# Patient Record
Sex: MALE | Race: WHITE | NOT HISPANIC OR LATINO | Employment: OTHER | URBAN - METROPOLITAN AREA
[De-identification: names, ages, dates, MRNs, and addresses within clinical notes are randomized per-mention and may not be internally consistent; named-entity substitution may affect disease eponyms.]

---

## 2017-08-25 ENCOUNTER — HOSPITAL ENCOUNTER (EMERGENCY)
Facility: HOSPITAL | Age: 67
Discharge: HOME | End: 2017-08-25
Attending: FAMILY MEDICINE
Payer: OTHER GOVERNMENT

## 2017-08-25 VITALS
BODY MASS INDEX: 31.84 KG/M2 | WEIGHT: 215 LBS | TEMPERATURE: 98 F | SYSTOLIC BLOOD PRESSURE: 129 MMHG | RESPIRATION RATE: 19 BRPM | HEIGHT: 69 IN | DIASTOLIC BLOOD PRESSURE: 69 MMHG | OXYGEN SATURATION: 98 % | HEART RATE: 65 BPM

## 2017-08-25 DIAGNOSIS — R00.2 PALPITATIONS: ICD-10-CM

## 2017-08-25 DIAGNOSIS — I48.92 ATRIAL FLUTTER WITH RAPID VENTRICULAR RESPONSE: Primary | ICD-10-CM

## 2017-08-25 DIAGNOSIS — I49.9 CARDIAC RHYTHM DISORDER OR DISTURBANCE OR CHANGE: ICD-10-CM

## 2017-08-25 LAB
ALBUMIN SERPL BCP-MCNC: 4.3 G/DL
ALP SERPL-CCNC: 104 U/L
ALT SERPL W/O P-5'-P-CCNC: 49 U/L
ANION GAP SERPL CALC-SCNC: 15 MMOL/L
AST SERPL-CCNC: 37 U/L
BASOPHILS # BLD AUTO: 0.05 K/UL
BASOPHILS NFR BLD: 0.6 %
BILIRUB SERPL-MCNC: 0.9 MG/DL
BUN SERPL-MCNC: 14 MG/DL
CALCIUM SERPL-MCNC: 10 MG/DL
CHLORIDE SERPL-SCNC: 106 MMOL/L
CO2 SERPL-SCNC: 24 MMOL/L
CREAT SERPL-MCNC: 0.8 MG/DL
DIFFERENTIAL METHOD: ABNORMAL
EOSINOPHIL # BLD AUTO: 0.3 K/UL
EOSINOPHIL NFR BLD: 4.2 %
ERYTHROCYTE [DISTWIDTH] IN BLOOD BY AUTOMATED COUNT: 13.9 %
EST. GFR  (AFRICAN AMERICAN): >60 ML/MIN/1.73 M^2
EST. GFR  (NON AFRICAN AMERICAN): >60 ML/MIN/1.73 M^2
GLUCOSE SERPL-MCNC: 187 MG/DL
HCT VFR BLD AUTO: 44.1 %
HGB BLD-MCNC: 15.2 G/DL
LYMPHOCYTES # BLD AUTO: 2 K/UL
LYMPHOCYTES NFR BLD: 25.3 %
MCH RBC QN AUTO: 32 PG
MCHC RBC AUTO-ENTMCNC: 34.5 G/DL
MCV RBC AUTO: 93 FL
MONOCYTES # BLD AUTO: 1 K/UL
MONOCYTES NFR BLD: 12 %
NEUTROPHILS # BLD AUTO: 4.7 K/UL
NEUTROPHILS NFR BLD: 57.7 %
NT-PROBNP: 746 PG/ML
PLATELET # BLD AUTO: 227 K/UL
PMV BLD AUTO: 11.7 FL
POTASSIUM SERPL-SCNC: 3.5 MMOL/L
PROT SERPL-MCNC: 7.4 G/DL
RBC # BLD AUTO: 4.75 M/UL
SODIUM SERPL-SCNC: 145 MMOL/L
TROPONIN I SERPL DL<=0.01 NG/ML-MCNC: <0.012 NG/ML
WBC # BLD AUTO: 8.07 K/UL

## 2017-08-25 PROCEDURE — 96374 THER/PROPH/DIAG INJ IV PUSH: CPT

## 2017-08-25 PROCEDURE — 93010 ELECTROCARDIOGRAM REPORT: CPT | Mod: ,,, | Performed by: INTERNAL MEDICINE

## 2017-08-25 PROCEDURE — 99284 EMERGENCY DEPT VISIT MOD MDM: CPT | Mod: 25

## 2017-08-25 PROCEDURE — 85025 COMPLETE CBC W/AUTO DIFF WBC: CPT

## 2017-08-25 PROCEDURE — 83880 ASSAY OF NATRIURETIC PEPTIDE: CPT

## 2017-08-25 PROCEDURE — 96361 HYDRATE IV INFUSION ADD-ON: CPT

## 2017-08-25 PROCEDURE — 94760 N-INVAS EAR/PLS OXIMETRY 1: CPT

## 2017-08-25 PROCEDURE — 25000003 PHARM REV CODE 250: Performed by: FAMILY MEDICINE

## 2017-08-25 PROCEDURE — 93005 ELECTROCARDIOGRAM TRACING: CPT

## 2017-08-25 PROCEDURE — 80053 COMPREHEN METABOLIC PANEL: CPT

## 2017-08-25 PROCEDURE — 27000221 HC OXYGEN, UP TO 24 HOURS

## 2017-08-25 PROCEDURE — 84484 ASSAY OF TROPONIN QUANT: CPT

## 2017-08-25 RX ORDER — BUPROPION HYDROCHLORIDE 150 MG/1
150 TABLET, EXTENDED RELEASE ORAL 2 TIMES DAILY
COMMUNITY

## 2017-08-25 RX ORDER — PREGABALIN 75 MG/1
75 CAPSULE ORAL 2 TIMES DAILY
COMMUNITY

## 2017-08-25 RX ORDER — POTASSIUM CHLORIDE 1.5 G/1.58G
20 POWDER, FOR SOLUTION ORAL ONCE
COMMUNITY

## 2017-08-25 RX ORDER — DILTIAZEM HYDROCHLORIDE EXTENDED-RELEASE TABLETS 120 MG/1
120 TABLET, EXTENDED RELEASE ORAL DAILY
Qty: 30 TABLET | Refills: 0 | Status: SHIPPED | OUTPATIENT
Start: 2017-08-25 | End: 2018-08-25

## 2017-08-25 RX ORDER — METFORMIN HYDROCHLORIDE 1000 MG/1
1000 TABLET ORAL 2 TIMES DAILY WITH MEALS
COMMUNITY

## 2017-08-25 RX ORDER — HYDROXYZINE HYDROCHLORIDE 25 MG/1
25 TABLET, FILM COATED ORAL 3 TIMES DAILY PRN
COMMUNITY

## 2017-08-25 RX ORDER — ASPIRIN 325 MG
325 TABLET ORAL
Status: COMPLETED | OUTPATIENT
Start: 2017-08-25 | End: 2017-08-25

## 2017-08-25 RX ORDER — DILTIAZEM HYDROCHLORIDE 5 MG/ML
20 INJECTION INTRAVENOUS
Status: COMPLETED | OUTPATIENT
Start: 2017-08-25 | End: 2017-08-25

## 2017-08-25 RX ORDER — FLUOXETINE HYDROCHLORIDE 20 MG/1
20 CAPSULE ORAL DAILY
COMMUNITY

## 2017-08-25 RX ORDER — METOPROLOL TARTRATE 1 MG/ML
5 INJECTION, SOLUTION INTRAVENOUS
Status: DISCONTINUED | OUTPATIENT
Start: 2017-08-25 | End: 2017-08-25

## 2017-08-25 RX ORDER — AMLODIPINE BESYLATE 10 MG/1
10 TABLET ORAL DAILY
COMMUNITY

## 2017-08-25 RX ADMIN — SODIUM CHLORIDE 500 ML: 0.9 INJECTION, SOLUTION INTRAVENOUS at 03:08

## 2017-08-25 RX ADMIN — ASPIRIN 325 MG ORAL TABLET 325 MG: 325 PILL ORAL at 03:08

## 2017-08-25 RX ADMIN — DILTIAZEM HYDROCHLORIDE 20 MG: 5 INJECTION INTRAVENOUS at 03:08

## 2017-08-25 NOTE — ED PROVIDER NOTES
Encounter Date: 8/25/2017       History     Chief Complaint   Patient presents with    Tachycardia     Pt reports a sensation of heart racing and states that his HR this am was 130's, denies cp      66-year-old male presents with chief complaint of tachycardia.  Patient reports awoke today noted his heart was beating rapidly.  Also notes chest heaviness.  Patient denies any shortness of breath.  Denies any fever chills states the chest heaviness is mild.          Review of patient's allergies indicates:  No Known Allergies  Past Medical History:   Diagnosis Date    Diabetes mellitus     High cholesterol     Hypertension     Neuropathy     PTSD (post-traumatic stress disorder)      History reviewed. No pertinent surgical history.  History reviewed. No pertinent family history.  Social History   Substance Use Topics    Smoking status: Former Smoker    Smokeless tobacco: Never Used    Alcohol use Yes     Review of Systems   Constitutional: Negative for chills and fever.   Respiratory: Negative for shortness of breath.    Cardiovascular: Positive for chest pain.   Gastrointestinal: Negative for abdominal pain, nausea and vomiting.   All other systems reviewed and are negative.      Physical Exam     Initial Vitals [08/25/17 1456]   BP Pulse Resp Temp SpO2   126/79 (!) 126 20 98.1 °F (36.7 °C) 97 %      MAP       94.67         Physical Exam    Nursing note and vitals reviewed.  Constitutional: He appears well-developed and well-nourished.   HENT:   Head: Normocephalic and atraumatic.   Eyes: EOM are normal. Pupils are equal, round, and reactive to light.   Neck: Normal range of motion. Neck supple.   Cardiovascular: Normal rate, regular rhythm and normal heart sounds.   Pulmonary/Chest: Breath sounds normal.   Abdominal: Soft.   Musculoskeletal: Normal range of motion.   Neurological: He is alert and oriented to person, place, and time.   Skin: Skin is warm. Capillary refill takes less than 2 seconds.    Psychiatric: He has a normal mood and affect. His behavior is normal.         ED Course   Procedures  Labs Reviewed   CBC W/ AUTO DIFFERENTIAL - Abnormal; Notable for the following:        Result Value    MCH 32.0 (*)     All other components within normal limits   COMPREHENSIVE METABOLIC PANEL - Abnormal; Notable for the following:     Glucose 187 (*)     ALT 49 (*)     All other components within normal limits   TROPONIN I   NT-PRO NATRIURETIC PEPTIDE     EKG Readings: (Independently Interpreted)   Initial Reading: No STEMI. Rhythm: Atrial Flutter.   atrial flutter at 31 beats for minute normal QRS and normal QT                            ED Course    4:30 PM patient converted back into sinus rhythm continues to deny any complaints at present.  Discussed with Dr. Santiago concurs with outpatient management will DC the amlodipine and start the patient on Cardizem 120mg  And follow up with cardiology on Monday.  Clinical Impression:   The primary encounter diagnosis was Atrial flutter with rapid ventricular response. Diagnoses of Palpitations and Cardiac rhythm disorder or disturbance or change were also pertinent to this visit.                           Jcarlos Jenkins MD  08/25/17 4773

## 2024-09-13 ENCOUNTER — HOSPITAL ENCOUNTER (INPATIENT)
Facility: HOSPITAL | Age: 74
LOS: 1 days | Discharge: NURSING, REHAB OR LONG-TERM CARE | DRG: 069 | End: 2024-09-15
Attending: STUDENT IN AN ORGANIZED HEALTH CARE EDUCATION/TRAINING PROGRAM | Admitting: STUDENT IN AN ORGANIZED HEALTH CARE EDUCATION/TRAINING PROGRAM
Payer: OTHER GOVERNMENT

## 2024-09-13 DIAGNOSIS — R29.818 ACUTE FOCAL NEUROLOGICAL DEFICIT: ICD-10-CM

## 2024-09-13 DIAGNOSIS — I63.02 STROKE DUE TO THROMBOSIS OF BASILAR ARTERY: Primary | ICD-10-CM

## 2024-09-13 PROBLEM — E11.9 TYPE 2 DIABETES MELLITUS WITHOUT COMPLICATION, WITHOUT LONG-TERM CURRENT USE OF INSULIN: Status: ACTIVE | Noted: 2024-09-13

## 2024-09-13 PROBLEM — I48.92 ATRIAL FLUTTER: Status: ACTIVE | Noted: 2024-09-13

## 2024-09-13 PROBLEM — F43.10 PTSD (POST-TRAUMATIC STRESS DISORDER): Status: ACTIVE | Noted: 2024-09-13

## 2024-09-13 PROBLEM — I10 PRIMARY HYPERTENSION: Status: ACTIVE | Noted: 2024-09-13

## 2024-09-13 PROBLEM — F32.A DEPRESSION: Status: ACTIVE | Noted: 2024-09-13

## 2024-09-13 PROBLEM — E78.2 MIXED HYPERLIPIDEMIA: Status: ACTIVE | Noted: 2024-09-13

## 2024-09-13 LAB
ALBUMIN SERPL BCP-MCNC: 3.6 G/DL (ref 3.5–5.2)
ALP SERPL-CCNC: 101 U/L (ref 55–135)
ALT SERPL W/O P-5'-P-CCNC: 24 U/L (ref 10–44)
ANION GAP SERPL CALC-SCNC: 14 MMOL/L (ref 8–16)
AST SERPL-CCNC: 17 U/L (ref 10–40)
BASOPHILS # BLD AUTO: 0.06 K/UL (ref 0–0.2)
BASOPHILS NFR BLD: 0.6 % (ref 0–1.9)
BILIRUB SERPL-MCNC: 0.9 MG/DL (ref 0.1–1)
BNP SERPL-MCNC: 15 PG/ML (ref 0–99)
BUN SERPL-MCNC: 45 MG/DL (ref 8–23)
CALCIUM SERPL-MCNC: 10.1 MG/DL (ref 8.7–10.5)
CHLORIDE SERPL-SCNC: 104 MMOL/L (ref 95–110)
CHOLEST SERPL-MCNC: 122 MG/DL (ref 120–199)
CHOLEST/HDLC SERPL: 4.2 {RATIO} (ref 2–5)
CO2 SERPL-SCNC: 22 MMOL/L (ref 23–29)
CREAT SERPL-MCNC: 1.1 MG/DL (ref 0.5–1.4)
CREAT SERPL-MCNC: 1.3 MG/DL (ref 0.5–1.4)
DIFFERENTIAL METHOD BLD: ABNORMAL
EOSINOPHIL # BLD AUTO: 0.6 K/UL (ref 0–0.5)
EOSINOPHIL NFR BLD: 5.4 % (ref 0–8)
ERYTHROCYTE [DISTWIDTH] IN BLOOD BY AUTOMATED COUNT: 13.4 % (ref 11.5–14.5)
EST. GFR  (NO RACE VARIABLE): >60 ML/MIN/1.73 M^2
GLUCOSE SERPL-MCNC: 96 MG/DL (ref 70–110)
HCT VFR BLD AUTO: 40.3 % (ref 40–54)
HDLC SERPL-MCNC: 29 MG/DL (ref 40–75)
HDLC SERPL: 23.8 % (ref 20–50)
HGB BLD-MCNC: 13 G/DL (ref 14–18)
IMM GRANULOCYTES # BLD AUTO: 0.06 K/UL (ref 0–0.04)
IMM GRANULOCYTES NFR BLD AUTO: 0.6 % (ref 0–0.5)
INR PPP: 0.9 (ref 0.8–1.2)
LDLC SERPL CALC-MCNC: 71 MG/DL (ref 63–159)
LYMPHOCYTES # BLD AUTO: 3.7 K/UL (ref 1–4.8)
LYMPHOCYTES NFR BLD: 34.4 % (ref 18–48)
MCH RBC QN AUTO: 31.9 PG (ref 27–31)
MCHC RBC AUTO-ENTMCNC: 32.3 G/DL (ref 32–36)
MCV RBC AUTO: 99 FL (ref 82–98)
MONOCYTES # BLD AUTO: 1.4 K/UL (ref 0.3–1)
MONOCYTES NFR BLD: 12.5 % (ref 4–15)
NEUTROPHILS # BLD AUTO: 5.1 K/UL (ref 1.8–7.7)
NEUTROPHILS NFR BLD: 46.5 % (ref 38–73)
NONHDLC SERPL-MCNC: 93 MG/DL
NRBC BLD-RTO: 0 /100 WBC
OHS QRS DURATION: 98 MS
OHS QTC CALCULATION: 445 MS
PLATELET # BLD AUTO: 218 K/UL (ref 150–450)
PMV BLD AUTO: 11.7 FL (ref 9.2–12.9)
POC PTINR: 1.2 (ref 0.9–1.2)
POC PTWBT: 14.1 SEC (ref 9.7–14.3)
POCT GLUCOSE: 114 MG/DL (ref 70–110)
POTASSIUM SERPL-SCNC: 4 MMOL/L (ref 3.5–5.1)
PROT SERPL-MCNC: 7.1 G/DL (ref 6–8.4)
PROTHROMBIN TIME: 10.3 SEC (ref 9–12.5)
RBC # BLD AUTO: 4.07 M/UL (ref 4.6–6.2)
SAMPLE: NORMAL
SAMPLE: NORMAL
SODIUM SERPL-SCNC: 140 MMOL/L (ref 136–145)
TRIGL SERPL-MCNC: 110 MG/DL (ref 30–150)
TROPONIN I SERPL DL<=0.01 NG/ML-MCNC: <0.006 NG/ML (ref 0–0.03)
TSH SERPL DL<=0.005 MIU/L-ACNC: 0.79 UIU/ML (ref 0.4–4)
WBC # BLD AUTO: 10.84 K/UL (ref 3.9–12.7)

## 2024-09-13 PROCEDURE — 85025 COMPLETE CBC W/AUTO DIFF WBC: CPT | Performed by: EMERGENCY MEDICINE

## 2024-09-13 PROCEDURE — 25000003 PHARM REV CODE 250: Performed by: PHYSICIAN ASSISTANT

## 2024-09-13 PROCEDURE — 84484 ASSAY OF TROPONIN QUANT: CPT | Performed by: EMERGENCY MEDICINE

## 2024-09-13 PROCEDURE — 99285 EMERGENCY DEPT VISIT HI MDM: CPT | Mod: 25

## 2024-09-13 PROCEDURE — 83880 ASSAY OF NATRIURETIC PEPTIDE: CPT | Performed by: EMERGENCY MEDICINE

## 2024-09-13 PROCEDURE — G0378 HOSPITAL OBSERVATION PER HR: HCPCS

## 2024-09-13 PROCEDURE — 83036 HEMOGLOBIN GLYCOSYLATED A1C: CPT | Performed by: PHYSICIAN ASSISTANT

## 2024-09-13 PROCEDURE — 25000003 PHARM REV CODE 250: Performed by: STUDENT IN AN ORGANIZED HEALTH CARE EDUCATION/TRAINING PROGRAM

## 2024-09-13 PROCEDURE — 85610 PROTHROMBIN TIME: CPT

## 2024-09-13 PROCEDURE — 99900035 HC TECH TIME PER 15 MIN (STAT)

## 2024-09-13 PROCEDURE — 85610 PROTHROMBIN TIME: CPT | Performed by: EMERGENCY MEDICINE

## 2024-09-13 PROCEDURE — 80061 LIPID PANEL: CPT | Performed by: EMERGENCY MEDICINE

## 2024-09-13 PROCEDURE — 84443 ASSAY THYROID STIM HORMONE: CPT | Performed by: EMERGENCY MEDICINE

## 2024-09-13 PROCEDURE — 25500020 PHARM REV CODE 255: Performed by: STUDENT IN AN ORGANIZED HEALTH CARE EDUCATION/TRAINING PROGRAM

## 2024-09-13 PROCEDURE — 93010 ELECTROCARDIOGRAM REPORT: CPT | Mod: ,,, | Performed by: INTERNAL MEDICINE

## 2024-09-13 PROCEDURE — 93005 ELECTROCARDIOGRAM TRACING: CPT

## 2024-09-13 PROCEDURE — 82962 GLUCOSE BLOOD TEST: CPT

## 2024-09-13 PROCEDURE — 99223 1ST HOSP IP/OBS HIGH 75: CPT | Mod: ,,, | Performed by: STUDENT IN AN ORGANIZED HEALTH CARE EDUCATION/TRAINING PROGRAM

## 2024-09-13 PROCEDURE — 82565 ASSAY OF CREATININE: CPT

## 2024-09-13 PROCEDURE — 80053 COMPREHEN METABOLIC PANEL: CPT | Performed by: EMERGENCY MEDICINE

## 2024-09-13 RX ORDER — CARVEDILOL 12.5 MG/1
12.5 TABLET ORAL EVERY MORNING
COMMUNITY

## 2024-09-13 RX ORDER — IBUPROFEN 200 MG
24 TABLET ORAL
Status: DISCONTINUED | OUTPATIENT
Start: 2024-09-13 | End: 2024-09-15 | Stop reason: HOSPADM

## 2024-09-13 RX ORDER — HYDROXYZINE HYDROCHLORIDE 25 MG/1
25 TABLET, FILM COATED ORAL
Status: ACTIVE | OUTPATIENT
Start: 2024-09-13 | End: 2024-09-14

## 2024-09-13 RX ORDER — LISINOPRIL 20 MG/1
20 TABLET ORAL DAILY
COMMUNITY

## 2024-09-13 RX ORDER — LABETALOL HCL 20 MG/4 ML
10 SYRINGE (ML) INTRAVENOUS
Status: DISCONTINUED | OUTPATIENT
Start: 2024-09-13 | End: 2024-09-15 | Stop reason: HOSPADM

## 2024-09-13 RX ORDER — QUETIAPINE FUMARATE 25 MG/1
50 TABLET, FILM COATED ORAL NIGHTLY
Status: DISCONTINUED | OUTPATIENT
Start: 2024-09-13 | End: 2024-09-13

## 2024-09-13 RX ORDER — QUETIAPINE FUMARATE 25 MG/1
50 TABLET, FILM COATED ORAL NIGHTLY
Status: DISCONTINUED | OUTPATIENT
Start: 2024-09-14 | End: 2024-09-15 | Stop reason: HOSPADM

## 2024-09-13 RX ORDER — IBUPROFEN 200 MG
16 TABLET ORAL
Status: DISCONTINUED | OUTPATIENT
Start: 2024-09-13 | End: 2024-09-15 | Stop reason: HOSPADM

## 2024-09-13 RX ORDER — BUPROPION HYDROCHLORIDE 150 MG/1
150 TABLET ORAL DAILY
Status: DISCONTINUED | OUTPATIENT
Start: 2024-09-14 | End: 2024-09-15 | Stop reason: HOSPADM

## 2024-09-13 RX ORDER — ASPIRIN 81 MG/1
81 TABLET ORAL DAILY
Status: DISCONTINUED | OUTPATIENT
Start: 2024-09-14 | End: 2024-09-15 | Stop reason: HOSPADM

## 2024-09-13 RX ORDER — ASPIRIN 81 MG/1
81 TABLET ORAL DAILY
COMMUNITY

## 2024-09-13 RX ORDER — CHOLECALCIFEROL (VITAMIN D3) 25 MCG
2000 TABLET ORAL DAILY
COMMUNITY

## 2024-09-13 RX ORDER — HYDROCHLOROTHIAZIDE 12.5 MG/1
12.5 TABLET ORAL DAILY
COMMUNITY

## 2024-09-13 RX ORDER — MIRABEGRON 25 MG/1
25 TABLET, FILM COATED, EXTENDED RELEASE ORAL DAILY
COMMUNITY

## 2024-09-13 RX ORDER — NAPROXEN SODIUM 220 MG/1
81 TABLET, FILM COATED ORAL DAILY
COMMUNITY
End: 2024-09-13 | Stop reason: CLARIF

## 2024-09-13 RX ORDER — AMOXICILLIN 250 MG
2 CAPSULE ORAL DAILY PRN
COMMUNITY

## 2024-09-13 RX ORDER — CARVEDILOL 25 MG/1
25 TABLET ORAL NIGHTLY
Status: DISCONTINUED | OUTPATIENT
Start: 2024-09-13 | End: 2024-09-15 | Stop reason: HOSPADM

## 2024-09-13 RX ORDER — ACETAMINOPHEN 325 MG/1
650 TABLET ORAL EVERY 6 HOURS PRN
Status: DISCONTINUED | OUTPATIENT
Start: 2024-09-13 | End: 2024-09-15 | Stop reason: HOSPADM

## 2024-09-13 RX ORDER — CHOLECALCIFEROL (VITAMIN D3) 25 MCG
2000 TABLET ORAL DAILY
Status: DISCONTINUED | OUTPATIENT
Start: 2024-09-14 | End: 2024-09-15 | Stop reason: HOSPADM

## 2024-09-13 RX ORDER — DEXTROMETHORPHAN HYDROBROMIDE, GUAIFENESIN 5; 100 MG/5ML; MG/5ML
650 LIQUID ORAL EVERY 6 HOURS PRN
COMMUNITY

## 2024-09-13 RX ORDER — LANOLIN ALCOHOL/MO/W.PET/CERES
100 CREAM (GRAM) TOPICAL DAILY
COMMUNITY

## 2024-09-13 RX ORDER — TRAZODONE HYDROCHLORIDE 50 MG/1
25 TABLET ORAL NIGHTLY PRN
COMMUNITY

## 2024-09-13 RX ORDER — GABAPENTIN 100 MG/1
200 CAPSULE ORAL 2 TIMES DAILY
COMMUNITY

## 2024-09-13 RX ORDER — SODIUM CHLORIDE 0.9 % (FLUSH) 0.9 %
10 SYRINGE (ML) INJECTION
Status: DISCONTINUED | OUTPATIENT
Start: 2024-09-13 | End: 2024-09-15 | Stop reason: HOSPADM

## 2024-09-13 RX ORDER — GABAPENTIN 100 MG/1
200 CAPSULE ORAL 2 TIMES DAILY
Status: DISCONTINUED | OUTPATIENT
Start: 2024-09-13 | End: 2024-09-15 | Stop reason: HOSPADM

## 2024-09-13 RX ORDER — ATORVASTATIN CALCIUM 80 MG/1
80 TABLET, FILM COATED ORAL DAILY
COMMUNITY

## 2024-09-13 RX ORDER — ATORVASTATIN CALCIUM 40 MG/1
80 TABLET, FILM COATED ORAL DAILY
Status: DISCONTINUED | OUTPATIENT
Start: 2024-09-14 | End: 2024-09-15 | Stop reason: HOSPADM

## 2024-09-13 RX ORDER — LANOLIN ALCOHOL/MO/W.PET/CERES
1000 CREAM (GRAM) TOPICAL DAILY
Status: DISCONTINUED | OUTPATIENT
Start: 2024-09-14 | End: 2024-09-15 | Stop reason: HOSPADM

## 2024-09-13 RX ORDER — BUPROPION HYDROCHLORIDE 75 MG/1
150 TABLET ORAL
Status: COMPLETED | OUTPATIENT
Start: 2024-09-13 | End: 2024-09-13

## 2024-09-13 RX ORDER — QUETIAPINE FUMARATE 25 MG/1
50 TABLET, FILM COATED ORAL NIGHTLY
COMMUNITY

## 2024-09-13 RX ORDER — CLOPIDOGREL BISULFATE 75 MG/1
75 TABLET ORAL DAILY
COMMUNITY

## 2024-09-13 RX ORDER — BISACODYL 10 MG/1
10 SUPPOSITORY RECTAL DAILY PRN
Status: DISCONTINUED | OUTPATIENT
Start: 2024-09-13 | End: 2024-09-15 | Stop reason: HOSPADM

## 2024-09-13 RX ORDER — FLUOXETINE HYDROCHLORIDE 20 MG/1
60 CAPSULE ORAL DAILY
Status: DISCONTINUED | OUTPATIENT
Start: 2024-09-14 | End: 2024-09-15 | Stop reason: HOSPADM

## 2024-09-13 RX ORDER — CARVEDILOL 25 MG/1
25 TABLET ORAL NIGHTLY
COMMUNITY

## 2024-09-13 RX ORDER — OXYBUTYNIN CHLORIDE 5 MG/1
5 TABLET, EXTENDED RELEASE ORAL DAILY
Status: DISCONTINUED | OUTPATIENT
Start: 2024-09-14 | End: 2024-09-15 | Stop reason: HOSPADM

## 2024-09-13 RX ORDER — INSULIN ASPART 100 [IU]/ML
0-5 INJECTION, SOLUTION INTRAVENOUS; SUBCUTANEOUS
Status: DISCONTINUED | OUTPATIENT
Start: 2024-09-13 | End: 2024-09-15 | Stop reason: HOSPADM

## 2024-09-13 RX ORDER — GLUCAGON 1 MG
1 KIT INJECTION
Status: DISCONTINUED | OUTPATIENT
Start: 2024-09-13 | End: 2024-09-15 | Stop reason: HOSPADM

## 2024-09-13 RX ORDER — POLYETHYLENE GLYCOL 3350 17 G/17G
17 POWDER, FOR SOLUTION ORAL DAILY PRN
COMMUNITY
End: 2024-09-13

## 2024-09-13 RX ORDER — CARVEDILOL 12.5 MG/1
12.5 TABLET ORAL EVERY MORNING
Status: DISCONTINUED | OUTPATIENT
Start: 2024-09-14 | End: 2024-09-15 | Stop reason: HOSPADM

## 2024-09-13 RX ORDER — BIOTIN 5000 MCG
6 TABLET,DISINTEGRATING ORAL NIGHTLY PRN
COMMUNITY

## 2024-09-13 RX ORDER — QUETIAPINE FUMARATE 25 MG/1
50 TABLET, FILM COATED ORAL
Status: COMPLETED | OUTPATIENT
Start: 2024-09-13 | End: 2024-09-13

## 2024-09-13 RX ORDER — CLOPIDOGREL BISULFATE 75 MG/1
75 TABLET ORAL DAILY
Status: DISCONTINUED | OUTPATIENT
Start: 2024-09-14 | End: 2024-09-15 | Stop reason: HOSPADM

## 2024-09-13 RX ORDER — MULTIVITAMIN
1 TABLET ORAL DAILY
COMMUNITY

## 2024-09-13 RX ORDER — AMOXICILLIN 250 MG
2 CAPSULE ORAL DAILY PRN
Status: DISCONTINUED | OUTPATIENT
Start: 2024-09-13 | End: 2024-09-15 | Stop reason: HOSPADM

## 2024-09-13 RX ORDER — HEPARIN SODIUM 5000 [USP'U]/ML
5000 INJECTION, SOLUTION INTRAVENOUS; SUBCUTANEOUS EVERY 8 HOURS
Status: DISCONTINUED | OUTPATIENT
Start: 2024-09-13 | End: 2024-09-15 | Stop reason: HOSPADM

## 2024-09-13 RX ORDER — LOPERAMIDE HCL 2 MG
2 TABLET ORAL 3 TIMES DAILY PRN
COMMUNITY

## 2024-09-13 RX ADMIN — IOHEXOL 100 ML: 350 INJECTION, SOLUTION INTRAVENOUS at 12:09

## 2024-09-13 RX ADMIN — CARVEDILOL 25 MG: 25 TABLET, FILM COATED ORAL at 08:09

## 2024-09-13 RX ADMIN — QUETIAPINE FUMARATE 50 MG: 25 TABLET ORAL at 08:09

## 2024-09-13 RX ADMIN — BUPROPION HYDROCHLORIDE 150 MG: 75 TABLET, FILM COATED ORAL at 08:09

## 2024-09-13 RX ADMIN — GABAPENTIN 200 MG: 100 CAPSULE ORAL at 08:09

## 2024-09-13 NOTE — ED NOTES
Eran Patel, a 73 y.o. male presents to the ED w/ complaint of worsening right sided weakness. Pt is admitted to [facility] for recent stroke with rt sided deficits. Pt states this am approx 0500 he felt he could not move his rt leg and rt arm was weaker.    Triage note:  Chief Complaint   Patient presents with    Extremity Weakness     Increased right sided weakness at 5AM this morning. Recent CVA 2 weeks ago.      Review of patient's allergies indicates:  No Known Allergies  Past Medical History:   Diagnosis Date    Diabetes mellitus     High cholesterol     Hypertension     Neuropathy     PTSD (post-traumatic stress disorder)          Patient identifiers verified and correct for   LOC: The patient is awake, alert and aware of environment with an appropriate affect, the patient is oriented x 3 speaking with some dysarthria..   APPEARANCE: Patient appears comfortable and in no acute distress, patient is clean and well groomed.  SKIN: The skin is warm and dry, color consistent with ethnicity, patient has normal skin turgor and moist mucus membranes, skin intact, no breakdown or bruising noted.   MUSCULOSKELETAL: Patient has rt sided weakness, states is worse this morning.  RESPIRATORY: Airway is open and patent, respirations are spontaneous, patient has a normal effort and rate, no accessory muscle use noted, pt placed on continuous pulse ox with O2 sats noted at 97% on room air.  CARDIAC: Pt placed on cardiac monitor. Patient has a normal rate and regular rhythm, no edema noted, capillary refill < 3 seconds.   GASTRO: Soft and non tender to palpation, no distention noted, normoactive bowel sounds present in all four quadrants. Pt states bowel movements have been regular.  : Pt denies any pain or frequency with urination.  NEURO: Pt opens eyes spontaneously, behavior appropriate to situation, follows commands, facial expression asymetrical, slight droop on rt side, rt hand  weak, left normal and even,  purposeful motor response noted, normal sensation in all extremities when touched with a finger.

## 2024-09-13 NOTE — ED NOTES
Pt becoming restless and voicing that he wants to leave if he cannot have the MRI scan soon. MRI contacted he will be brought over soon.

## 2024-09-13 NOTE — ED PROVIDER NOTES
Chief Complaint   Extremity Weakness (Increased right sided weakness at 5AM this morning. Recent CVA 2 weeks ago. )      History Of Present Illness   Eran Patel is a 73 y.o. male with a PMHx including HTN, HLD, DM2, Basal cell carcinoma, PTSD, depression, and recent pontine stroke on 9/3  presenting with worsened R sided weakness.  Patient was recently seen at an outside hospital on 09/03 after presenting with right-sided weakness and left-sided facial droop.  He was found to have a pontine stroke.  No acute intervention at that time.  Patient was discharged to rehab.  Patient states that he went to bed last night around 10:00 p.m. feeling his baseline.  States he woke up at around 5:00 a.m. and had increased right-sided weakness.  States that he tried to go to his PT and OT but felt like his right leg was in cement.  He otherwise reports no recent falls or head injury.  He reports no chest pain, shortness of breath, abdominal pain, vomiting, or diarrhea.  He reports no left-sided weakness or numbness.    Independent Historian: Yes  Other Historian or Collateral: Chart review  Interpretor: No      Review of patient's allergies indicates:  No Known Allergies    No current facility-administered medications on file prior to encounter.     Current Outpatient Medications on File Prior to Encounter   Medication Sig Dispense Refill    alprostadil (MUSE) 1000 MCG pellet 1,000 mcg by Transurethral route as needed for Erectile Dysfunction. use no more than 3 times per week      amlodipine (NORVASC) 10 MG tablet Take 10 mg by mouth once daily.      buPROPion (WELLBUTRIN SR) 150 MG TBSR 12 hr tablet Take 150 mg by mouth 2 (two) times daily.      diltiaZEM (CARDIZEM LA) 120 mg 24 hr tablet Take 1 tablet (120 mg total) by mouth once daily. 30 tablet 0    fluoxetine (PROZAC) 20 MG capsule Take 20 mg by mouth once daily.      hydrOXYzine HCl (ATARAX) 25 MG tablet Take 25 mg by mouth 3 (three) times daily as needed for  Itching.      metformin (GLUCOPHAGE) 1000 MG tablet Take 1,000 mg by mouth 2 (two) times daily with meals.      metoclopramide HCl (REGLAN) 10 MG tablet Take 1 tablet (10 mg total) by mouth every 6 (six) hours. 30 tablet 0    potassium chloride (KLOR-CON) 20 mEq Pack Take 20 mEq by mouth once.      pregabalin (LYRICA) 75 MG capsule Take 75 mg by mouth 2 (two) times daily.         Past History   As per HPI and below:  Past Medical History:   Diagnosis Date    Diabetes mellitus     High cholesterol     Hypertension     Neuropathy     PTSD (post-traumatic stress disorder)      No past surgical history on file.    Social History     Socioeconomic History    Marital status:    Tobacco Use    Smoking status: Former    Smokeless tobacco: Never   Substance and Sexual Activity    Alcohol use: Yes    Drug use: No     Social Determinants of Health     Financial Resource Strain: Low Risk  (9/9/2024)    Received from [facility]    Overall Financial Resource Strain (CARDIA)     Difficulty of Paying Living Expenses: Not hard at all   Food Insecurity: No Food Insecurity (9/9/2024)    Received from [facility]    Hunger Vital Sign     Worried About Running Out of Food in the Last Year: Never true     Ran Out of Food in the Last Year: Never true   Transportation Needs: No Transportation Needs (9/6/2024)    Received from [facility] Transportation Source     Has lack of transportation kept you from medical appointments or from getting medications?: No     Has lack of transportation kept you from meetings, work, or from getting things needed for daily living?: No   Stress: No Stress Concern Present (9/6/2024)    Received from [facility] - Occupational Stress Questionnaire     Feeling of Stress : Not at all   Housing Stability: Low Risk  (9/9/2024)    Received from [facility]    Housing Stability Vital Sign     Unable to Pay for Housing in the Last Year: No      Number of Times Moved in the Last Year: 1     Homeless in the Last Year: No       No family history on file.    Physical Exam     Vitals:    09/13/24 1236   BP: (!) 153/78   Pulse: 86   Resp: 18   Temp: 98.1 °F (36.7 °C)   TempSrc: Temporal   SpO2: 97%       Physical Exam  Constitutional:       General: He is not in acute distress.     Appearance: He is not ill-appearing, toxic-appearing or diaphoretic.   HENT:      Head: Normocephalic and atraumatic.      Right Ear: External ear normal.      Left Ear: External ear normal.      Nose: Nose normal.      Mouth/Throat:      Mouth: Mucous membranes are moist.      Pharynx: Oropharynx is clear.   Eyes:      Extraocular Movements: Extraocular movements intact.      Pupils: Pupils are equal, round, and reactive to light.   Cardiovascular:      Rate and Rhythm: Normal rate and regular rhythm.   Pulmonary:      Effort: No respiratory distress.      Breath sounds: No wheezing or rhonchi.   Chest:      Chest wall: No tenderness.   Abdominal:      General: There is no distension.      Tenderness: There is no abdominal tenderness. There is no guarding or rebound.   Musculoskeletal:         General: No swelling or deformity.      Cervical back: No rigidity.   Skin:     General: Skin is warm.      Capillary Refill: Capillary refill takes less than 2 seconds.   Neurological:      Mental Status: He is alert.      Comments: Left-sided facial droop, slurred speech  Right arm weakness, 4/5 strength  Right leg weakness, 2/5 strength  Grossly intact sensation to light touch.  Alert and oriented x4.             Results     Labs Reviewed   POCT GLUCOSE - Abnormal       Result Value    POCT Glucose 114 (*)    CBC W/ AUTO DIFFERENTIAL   COMPREHENSIVE METABOLIC PANEL   PROTIME-INR   TSH   LIPID PANEL   TROPONIN I   B-TYPE NATRIURETIC PEPTIDE   POCT GLUCOSE, HAND-HELD DEVICE       Imaging Results    None           Initial MDM   Medical Decision Making  Patient is a 72 yo M presenting with R  sided weakness. Most concerning for acute CVA, decreased perfusion to area of recent stroke, encephalopathy. Code stroke activated upon arrival. CT imaging without evidence of hemorrhage but concerning for subacute pontine stroke. Sxs improving upon reevaluation. Vascular neurology has seen the patient and advise no acute intervention at this time. MRI imaging pending.     Amount and/or Complexity of Data Reviewed  Labs:  Decision-making details documented in ED Course.    Risk  Prescription drug management.                  Medications Given / Interventions   Medications - No data to display    Procedures     ED POCUS Performed: No    Reassessment and ED Course     ED Course as of 09/17/24 1354   Fri Sep 13, 2024   1307 CT concerning for subacute pontine stroke.  No LVOT seen.  MRI pending to see change from prior. [CH]   1400 Troponin I: <0.006 [CH]   1401 CMP grossly okay [CH]   1401 CBC grossly okay [CH]   1401 POCT Glucose(!): 114 [CH]      ED Course User Index  [CH] Alesia Kern MD   Patient unable to get cleared for MRI at this time due to implant. [facility] neuro recommends admission and repeat CT in the AM. Discussed with patient and wife at bedside.            Final diagnoses:  [R29.818] Acute focal neurological deficit                 Dispo                        Alesia Kern MD  09/17/24 6152

## 2024-09-13 NOTE — ED NOTES
Pt sent back from MRI because he has a prosthetic penis. They want mfr info before they will scan him. Team notified. See orders.

## 2024-09-13 NOTE — SUBJECTIVE & OBJECTIVE
Past Medical History:   Diagnosis Date    Diabetes mellitus     High cholesterol     Hypertension     Neuropathy     PTSD (post-traumatic stress disorder)      No past surgical history on file.  Social History     Tobacco Use    Smoking status: Former    Smokeless tobacco: Never   Substance Use Topics    Alcohol use: Yes    Drug use: No     Review of patient's allergies indicates:  No Known Allergies    Medications: I have reviewed the current medication administration record.    (Not in a hospital admission)      Review of Systems   Constitutional:  Negative for fever.   HENT:  Negative for congestion and sore throat.    Genitourinary:  Negative for dysuria.   Neurological:  Positive for facial asymmetry, speech difficulty and weakness. Negative for numbness.     Objective:     Vital Signs (Most Recent):  Temp: 98.1 °F (36.7 °C) (09/13/24 1236)  Pulse: 86 (09/13/24 1236)  Resp: 18 (09/13/24 1236)  BP: (!) 153/78 (09/13/24 1236)  SpO2: 97 % (09/13/24 1236)    Vital Signs Range (Last 24H):  Temp:  [98.1 °F (36.7 °C)]   Pulse:  [86]   Resp:  [18]   BP: (153)/(78)   SpO2:  [97 %]        Physical Exam  Vitals reviewed.   Constitutional:       General: He is not in acute distress.  HENT:      Head: Normocephalic and atraumatic.   Cardiovascular:      Rate and Rhythm: Normal rate and regular rhythm.   Pulmonary:      Effort: Pulmonary effort is normal. No respiratory distress.   Skin:     General: Skin is warm and dry.   Neurological:      Mental Status: He is alert.              Neurological Exam:   LOC: alert  Attention Span: Good   Language: No aphasia  Articulation: Dysarthria  Orientation: Person, Place, Time   Visual Fields: Full  EOM (CN III, IV, VI): Full/intact  Facial Movement (CN VII): Lower facial weakness on the Right  Motor: Arm left  Normal 5/5  Leg left  Normal 5/5  Arm right  Paresis: 4/5  Leg right Paresis: 2/5  Sensation: Intact to light touch, temperature and vibration  Tone: Normal tone  "throughout      Laboratory:  CMP: No results for input(s): "GLUCOSE", "CALCIUM", "ALBUMIN", "PROT", "NA", "K", "CO2", "CL", "BUN", "CREATININE", "ALKPHOS", "ALT", "AST", "BILITOT" in the last 24 hours.  CBC:   Recent Labs   Lab 09/13/24  1249   WBC 10.84   RBC 4.07*   HGB 13.0*   HCT 40.3      MCV 99*   MCH 31.9*   MCHC 32.3     Lipid Panel: No results for input(s): "CHOL", "LDLCALC", "HDL", "TRIG" in the last 168 hours.  Coagulation:   Recent Labs   Lab 09/13/24  1249   INR 0.9     Hgb A1C: No results for input(s): "HGBA1C" in the last 168 hours.  TSH: No results for input(s): "TSH" in the last 168 hours.    Diagnostic Results:      Brain imaging:  MRI Brain pending    Vessel Imaging:  CTA multiphase 9/13/24  FINDINGS:  Prominence of ventricles and sulci in keeping with cerebral volume loss.  This appears fairly generalized, without overt lobar predominance. Configuration is not suggestive of hydrocephalus.     Focal hypoattenuation in the left aspect of the harriett concerning for an acute lacunar type infarct.        Additional patchy and confluent regions of hypoattenuation in the supratentorial white matter, nonspecific but most likely reflecting chronic small vessel ischemic changes. No recent or remote major vascular distribution infarct. No acute hemorrhage.  No mass effect or midline shift.     No extra-axial blood or fluid collections.     The cranium appears intact. Mastoid air cells and paranasal sinuses are essentially clear.     Moderate degenerative change throughout the cervical spine.        CTA:     The aortic arch demonstratesatherosclerotic calcification, but no significant stenosis at the major branch vessel origins.     The right common carotid artery is normal in caliber.  No significant plaque or stenosis at the carotid bifurcation.The right internal carotid artery is normal in caliber.     The left common carotid artery is normal in caliber. Calcification without significant stenosis at " the carotid bifurcation.The left internal carotid artery is normal in caliber.     The cervical right vertebral artery is normal in caliber.  Mild plaque in narrowing in the intracranial segment.     Cervical segment of the left vertebral artery is normal in caliber.  Mild plaque in narrowing in the intracranial segment     Scattered atherosclerotic irregularity and narrowing throughout the basilar artery     Moderate focal stenosis at the origin of the left PCA.     Moderate focal stenosis in the distal P1 segment of the right PCA.     Mild focal narrowing at the origin of the right MCA.  Additional moderate stenosis the origin of the superior and inferior divisions.  Tapered narrowing involving the distal M1 segment of the left MCA with mild narrowing distally.     Proximal ACAs appear within normal limits but there is mild irregularity narrowing distally.     Findings were relayed to the ordering provider (Concha ) via the epic secure chat system at approximately 13:00.    Cardiac Evaluation:   N/A

## 2024-09-13 NOTE — HPI
Eran Patel is a 73 y.o. male with PMHx of aflutter, harriett infarct, depssion, PTSD, GERD, HLD, HTN, and first degree AV block who presented to ED from [facility] for worsening RSW. [facility] harriett infarct. He was discharged to rehab on DAPT and atorvastatin. Per patient, he woke up with worsening RSW this morning. Stroke code activated on ED arrival. Patient last known normal yesterday at 10pm.

## 2024-09-14 LAB
ALBUMIN SERPL BCP-MCNC: 3.3 G/DL (ref 3.5–5.2)
ALP SERPL-CCNC: 101 U/L (ref 55–135)
ALT SERPL W/O P-5'-P-CCNC: 19 U/L (ref 10–44)
ANION GAP SERPL CALC-SCNC: 7 MMOL/L (ref 8–16)
APTT PPP: 29.3 SEC (ref 21–32)
AST SERPL-CCNC: 13 U/L (ref 10–40)
BASOPHILS # BLD AUTO: 0.07 K/UL (ref 0–0.2)
BASOPHILS NFR BLD: 0.8 % (ref 0–1.9)
BILIRUB SERPL-MCNC: 0.7 MG/DL (ref 0.1–1)
BUN SERPL-MCNC: 30 MG/DL (ref 8–23)
CALCIUM SERPL-MCNC: 9.5 MG/DL (ref 8.7–10.5)
CHLORIDE SERPL-SCNC: 108 MMOL/L (ref 95–110)
CO2 SERPL-SCNC: 25 MMOL/L (ref 23–29)
CREAT SERPL-MCNC: 0.9 MG/DL (ref 0.5–1.4)
DIFFERENTIAL METHOD BLD: ABNORMAL
EOSINOPHIL # BLD AUTO: 0.5 K/UL (ref 0–0.5)
EOSINOPHIL NFR BLD: 5.7 % (ref 0–8)
ERYTHROCYTE [DISTWIDTH] IN BLOOD BY AUTOMATED COUNT: 13.2 % (ref 11.5–14.5)
EST. GFR  (NO RACE VARIABLE): >60 ML/MIN/1.73 M^2
ESTIMATED AVG GLUCOSE: 103 MG/DL (ref 68–131)
GLUCOSE SERPL-MCNC: 142 MG/DL (ref 70–110)
HBA1C MFR BLD: 5.2 % (ref 4–5.6)
HCT VFR BLD AUTO: 39.1 % (ref 40–54)
HGB BLD-MCNC: 12.6 G/DL (ref 14–18)
IMM GRANULOCYTES # BLD AUTO: 0.02 K/UL (ref 0–0.04)
IMM GRANULOCYTES NFR BLD AUTO: 0.2 % (ref 0–0.5)
INR PPP: 1 (ref 0.8–1.2)
LYMPHOCYTES # BLD AUTO: 2.7 K/UL (ref 1–4.8)
LYMPHOCYTES NFR BLD: 32.5 % (ref 18–48)
MAGNESIUM SERPL-MCNC: 2 MG/DL (ref 1.6–2.6)
MCH RBC QN AUTO: 31.5 PG (ref 27–31)
MCHC RBC AUTO-ENTMCNC: 32.2 G/DL (ref 32–36)
MCV RBC AUTO: 98 FL (ref 82–98)
MONOCYTES # BLD AUTO: 1.2 K/UL (ref 0.3–1)
MONOCYTES NFR BLD: 13.9 % (ref 4–15)
NEUTROPHILS # BLD AUTO: 3.9 K/UL (ref 1.8–7.7)
NEUTROPHILS NFR BLD: 46.9 % (ref 38–73)
NRBC BLD-RTO: 0 /100 WBC
PHOSPHATE SERPL-MCNC: 3.6 MG/DL (ref 2.7–4.5)
PLATELET # BLD AUTO: 216 K/UL (ref 150–450)
PMV BLD AUTO: 11.9 FL (ref 9.2–12.9)
POCT GLUCOSE: 119 MG/DL (ref 70–110)
POCT GLUCOSE: 134 MG/DL (ref 70–110)
POTASSIUM SERPL-SCNC: 3.4 MMOL/L (ref 3.5–5.1)
PROT SERPL-MCNC: 6.5 G/DL (ref 6–8.4)
PROTHROMBIN TIME: 10.9 SEC (ref 9–12.5)
RBC # BLD AUTO: 4 M/UL (ref 4.6–6.2)
SODIUM SERPL-SCNC: 140 MMOL/L (ref 136–145)
TROPONIN I SERPL DL<=0.01 NG/ML-MCNC: 0.01 NG/ML (ref 0–0.03)
WBC # BLD AUTO: 8.3 K/UL (ref 3.9–12.7)

## 2024-09-14 PROCEDURE — 85730 THROMBOPLASTIN TIME PARTIAL: CPT | Performed by: PHYSICIAN ASSISTANT

## 2024-09-14 PROCEDURE — 84100 ASSAY OF PHOSPHORUS: CPT | Performed by: PHYSICIAN ASSISTANT

## 2024-09-14 PROCEDURE — 36415 COLL VENOUS BLD VENIPUNCTURE: CPT | Performed by: PHYSICIAN ASSISTANT

## 2024-09-14 PROCEDURE — 96372 THER/PROPH/DIAG INJ SC/IM: CPT | Performed by: PHYSICIAN ASSISTANT

## 2024-09-14 PROCEDURE — 85610 PROTHROMBIN TIME: CPT | Performed by: PHYSICIAN ASSISTANT

## 2024-09-14 PROCEDURE — 11000001 HC ACUTE MED/SURG PRIVATE ROOM

## 2024-09-14 PROCEDURE — 83735 ASSAY OF MAGNESIUM: CPT | Performed by: PHYSICIAN ASSISTANT

## 2024-09-14 PROCEDURE — 97116 GAIT TRAINING THERAPY: CPT

## 2024-09-14 PROCEDURE — 97161 PT EVAL LOW COMPLEX 20 MIN: CPT

## 2024-09-14 PROCEDURE — 80053 COMPREHEN METABOLIC PANEL: CPT | Performed by: PHYSICIAN ASSISTANT

## 2024-09-14 PROCEDURE — 84484 ASSAY OF TROPONIN QUANT: CPT | Performed by: PHYSICIAN ASSISTANT

## 2024-09-14 PROCEDURE — 97165 OT EVAL LOW COMPLEX 30 MIN: CPT

## 2024-09-14 PROCEDURE — 94761 N-INVAS EAR/PLS OXIMETRY MLT: CPT

## 2024-09-14 PROCEDURE — 63600175 PHARM REV CODE 636 W HCPCS: Performed by: PHYSICIAN ASSISTANT

## 2024-09-14 PROCEDURE — 99233 SBSQ HOSP IP/OBS HIGH 50: CPT | Mod: ,,, | Performed by: STUDENT IN AN ORGANIZED HEALTH CARE EDUCATION/TRAINING PROGRAM

## 2024-09-14 PROCEDURE — 85025 COMPLETE CBC W/AUTO DIFF WBC: CPT | Performed by: PHYSICIAN ASSISTANT

## 2024-09-14 PROCEDURE — 25000003 PHARM REV CODE 250: Performed by: PHYSICIAN ASSISTANT

## 2024-09-14 PROCEDURE — 97535 SELF CARE MNGMENT TRAINING: CPT

## 2024-09-14 RX ADMIN — FLUOXETINE HYDROCHLORIDE 60 MG: 20 CAPSULE ORAL at 08:09

## 2024-09-14 RX ADMIN — CARVEDILOL 25 MG: 25 TABLET, FILM COATED ORAL at 09:09

## 2024-09-14 RX ADMIN — THERA TABS 1 TABLET: TAB at 08:09

## 2024-09-14 RX ADMIN — CYANOCOBALAMIN TAB 1000 MCG 1000 MCG: 1000 TAB at 08:09

## 2024-09-14 RX ADMIN — BUPROPION HYDROCHLORIDE 150 MG: 150 TABLET, EXTENDED RELEASE ORAL at 08:09

## 2024-09-14 RX ADMIN — ATORVASTATIN CALCIUM 80 MG: 40 TABLET, FILM COATED ORAL at 08:09

## 2024-09-14 RX ADMIN — ASPIRIN 81 MG: 81 TABLET, COATED ORAL at 08:09

## 2024-09-14 RX ADMIN — CLOPIDOGREL BISULFATE 75 MG: 75 TABLET ORAL at 08:09

## 2024-09-14 RX ADMIN — GABAPENTIN 200 MG: 100 CAPSULE ORAL at 08:09

## 2024-09-14 RX ADMIN — GABAPENTIN 200 MG: 100 CAPSULE ORAL at 09:09

## 2024-09-14 RX ADMIN — OXYBUTYNIN CHLORIDE 5 MG: 5 TABLET, EXTENDED RELEASE ORAL at 08:09

## 2024-09-14 RX ADMIN — CARVEDILOL 12.5 MG: 12.5 TABLET, FILM COATED ORAL at 06:09

## 2024-09-14 RX ADMIN — HEPARIN SODIUM 5000 UNITS: 5000 INJECTION INTRAVENOUS; SUBCUTANEOUS at 02:09

## 2024-09-14 RX ADMIN — QUETIAPINE FUMARATE 50 MG: 25 TABLET ORAL at 09:09

## 2024-09-14 RX ADMIN — CHOLECALCIFEROL TAB 25 MCG (1000 UNIT) 2000 UNITS: 25 TAB at 08:09

## 2024-09-14 NOTE — ASSESSMENT & PLAN NOTE
Eran Patel is a 73 y.o. male with PMHx of aflutter, harriett infarct, depssion, PTSD, GERD, HLD, HTN, DM, and first degree AV block who presented to ED from [facility] for worsening RSW. [facility] with harriett infarct. He was discharged to rehab on DAPT and atorvastatin. Per patient, he woke up with worsening RSW this morning. Stroke code activated on ED arrival. Patient last known normal yesterday at 10pm. CTA confirmed L harriett infarct, no LVO. Patient had some improvement in strength on the R side while in ED. Attempting to obtain repeat MRI however will need to obtain info on patient's penile implant prior to scan. Will admit to vascular neurology for observation.    9/14: Pending CT H and return to [facility].    Antithrombotics for secondary stroke prevention: Antiplatelets: Aspirin: 81 mg daily  Clopidogrel: 75 mg daily    Statins for secondary stroke prevention and hyperlipidemia, if present:   Statins: Atorvastatin- 80 mg daily    Aggressive risk factor modification: HTN, DM, HLD     Rehab efforts: The patient has been evaluated by a stroke team provider and the therapy needs have been fully considered based off the presenting complaints and exam findings. The following therapy evaluations are needed: PT evaluate and treat, OT evaluate and treat, SLP evaluate and treat, PM&R evaluate for appropriate placement    Diagnostics ordered/pending: HgbA1C to assess blood glucose levels, MRI head without contrast to assess brain parenchyma    VTE prophylaxis: Heparin 5000 units SQ every 8 hours  Mechanical prophylaxis: Place SCDs    BP parameters: Infarct: No intervention, SBP <220       The patient is a 49y Male complaining of ETOH.

## 2024-09-14 NOTE — PROGRESS NOTES
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 male with PMHx of aflutter, harriett infarct, depssion, PTSD, GERD, HLD, HTN, DM, and first degree AV block who presented to ED from [facility] for worsening RSW. [facility] harriett infarct. He was discharged to rehab on DAPT and atorvastatin. Per patient, he woke up with worsening RSW this morning. Stroke code activated on ED arrival. Patient last known normal yesterday at 10pm. CTA confirmed L harriett infarct, no LVO. Patient had some improvement in strength on the R side while in ED. Attempting to obtain repeat MRI however will need to obtain info on patient's penile implant prior to scan. Will admit to vascular neurology for observation.    9/14: Pending CT H and return to [facility].    Antithrombotics for secondary stroke prevention: Antiplatelets: Aspirin: 81 mg daily  Clopidogrel: 75 mg daily    Statins for secondary stroke prevention and hyperlipidemia, if present:   Statins: Atorvastatin- 80 mg daily    Aggressive risk factor modification: HTN, DM, HLD     Rehab efforts: The patient has been evaluated by a stroke team provider and the therapy needs have been fully considered based off the presenting complaints and exam findings. The following therapy evaluations are needed: PT evaluate and treat, OT evaluate and treat, SLP evaluate and treat, PM&R evaluate for appropriate placement    Diagnostics ordered/pending: HgbA1C to assess blood glucose levels, MRI head without contrast to assess brain parenchyma    VTE prophylaxis: Heparin 5000 units SQ every 8 hours  Mechanical prophylaxis: Place SCDs    BP parameters: Infarct: No intervention, SBP <220        Type 2 diabetes mellitus without complication, without long-term current use of insulin  Stroke risk factor. A1C 5.    - Hold home metformin  -  Glucose 140-180  - SSI    Depression  Continue home meds    PTSD (post-traumatic stress disorder)  Continue home meds    Mixed hyperlipidemia  Stroke risk factor    - Continue atorvastatin 80    Primary hypertension  Stroke risk factor    - SBP <220  - Holding home BP meds except for carvedilol    Atrial flutter  Stroke risk factor. Confirmed on EKG in 2017.    - Not currently on anticoagulation         Eran Patel is a 73 year old male with history of aflutter, harriett infarct, depression , ptsd, gerd, hld, htn, first AV block presented from [facility] for worsening RSW. [facility] pontine [facility]. Discharged to rehab with dapt and atorvastatin. CTA without LVO. Pending CT H and return to [facility].    STROKE DOCUMENTATION   Acute Stroke Times   Last Known Normal Date: 09/12/24  Last Known Normal Time: 2200  Unknown Symptom Onset Date: Unknown Date  Unknown Symptom Onset Time: Unknown Time  Stroke Team Called Date: 09/13/24  Stroke Team Called Time: 1237  Stroke Team Arrival Date: 09/13/24  Stroke Team Arrival Time: 1244  CT Interpretation Time: 1247  Thrombolytic Therapy Recommended: No  CTA Interpretation Time: 1249  Thrombectomy Recommended: No    NIH Scale:  1a. Level of Consciousness: 0-->Alert, keenly responsive  1b. LOC Questions: 0-->Answers both questions correctly  1c. LOC Commands: 0-->Performs both tasks correctly  2. Best Gaze: 0-->Normal  3. Visual: 0-->No visual loss  4. Facial Palsy: 1-->Minor paralysis (flattened nasolabial fold, asymmetry on smiling)  5a. Motor Arm, Left: 0-->No drift, limb holds 90 (or 45) degrees for full 10 secs  5b. Motor Arm, Right: 1-->Drift, limb holds 90 (or 45) degrees, but drifts down before full 10 secs, does not hit bed or other support  6a. Motor Leg, Left: 0-->No drift, leg holds 30 degree position for full 5 secs  6b. Motor Leg, Right: 1-->Drift, leg falls by the end of the 5-sec period but does not hit bed  7. Limb Ataxia: 0-->Absent  8.  Sensory: 0-->Normal, no sensory loss  9. Best Language: 0-->No aphasia, normal  10. Dysarthria: 1-->Mild-to-moderate dysarthria, patient slurs at least some words and, at worst, can be understood with some difficulty  11. Extinction and Inattention (formerly Neglect): 0-->No abnormality  Total (NIH Stroke Scale): 4       Modified Huron Score: 3  Bastian Coma Scale:    ABCD2 Score:    [id number] Score:   HAS -BLED Score:   ICH Score:   Hunt & Guerrero Classification:      Hemorrhagic change of an Ischemic Stroke: Does this patient have an ischemic stroke with hemorrhagic changes? No     Interval History: Pending CT H. Will return to [facility].      Past Medical History:   Diagnosis Date    Diabetes mellitus     High cholesterol     Hypertension     Neuropathy     PTSD (post-traumatic stress disorder)      History reviewed. No pertinent surgical history.  Social History     Tobacco Use    Smoking status: Former    Smokeless tobacco: Never   Substance Use Topics    Alcohol use: Yes    Drug use: No     Review of patient's allergies indicates:  No Known Allergies    Medications: I have reviewed the current medication administration record.    Medications Prior to Admission   Medication Sig Dispense Refill Last Dose    acetaminophen (TYLENOL) 650 MG TbSR Take 650 mg by mouth every 6 (six) hours as needed.       amlodipine (NORVASC) 10 MG tablet Take 5 mg by mouth once daily.       aspirin (ECOTRIN) 81 MG EC tablet Take 81 mg by mouth once daily.       atorvastatin (LIPITOR) 80 MG tablet Take 80 mg by mouth once daily.       buPROPion (WELLBUTRIN SR) 150 MG TBSR 12 hr tablet Take 150 mg by mouth 2 (two) times daily.       carvediloL (COREG) 12.5 MG tablet Take 12.5 mg by mouth every morning.       carvediloL (COREG) 25 MG tablet Take 25 mg by mouth every evening.       clopidogreL (PLAVIX) 75 mg tablet Take 75 mg by mouth once daily.       cyanocobalamin (VITAMIN B-12) 1000 MCG tablet Take 100 mcg by mouth once daily.        diltiaZEM (CARDIZEM LA) 120 mg 24 hr tablet Take 1 tablet (120 mg total) by mouth once daily. 30 tablet 0     fluoxetine (PROZAC) 20 MG capsule Take 60 mg by mouth once daily.       gabapentin (NEURONTIN) 100 MG capsule Take 200 mg by mouth 2 (two) times daily.       hydroCHLOROthiazide (HYDRODIURIL) 12.5 MG Tab Take 12.5 mg by mouth once daily.       lisinopriL (PRINIVIL,ZESTRIL) 20 MG tablet Take 20 mg by mouth once daily.       loperamide (IMODIUM A-D) 2 mg Tab Take 2 mg by mouth 3 (three) times daily as needed.       melatonin 1 mg TbDL Take 6 mg by mouth nightly as needed.       metformin (GLUCOPHAGE) 1000 MG tablet Take 1,000 mg by mouth 2 (two) times daily with meals.       mirabegron (MYRBETRIQ) 25 mg Tb24 ER tablet Take 25 mg by mouth once daily.       multivitamin (THERAGRAN) per tablet Take 1 tablet by mouth once daily.       QUEtiapine (SEROQUEL) 25 MG Tab Take 50 mg by mouth every evening.       senna-docusate 8.6-50 mg (SENNA WITH DOCUSATE SODIUM) 8.6-50 mg per tablet Take 2 tablets by mouth daily as needed for Constipation.       traZODone (DESYREL) 50 MG tablet Take 25 mg by mouth nightly as needed for Insomnia.       vitamin D (VITAMIN D3) 1000 units Tab Take 2,000 Units by mouth once daily.          Review of Systems   Constitutional:  Negative for fever.   HENT:  Negative for congestion and sore throat.    Genitourinary:  Negative for dysuria.   Neurological:  Positive for facial asymmetry, speech difficulty and weakness. Negative for numbness.     Objective:     Vital Signs (Most Recent):  Temp: 98.1 °F (36.7 °C) (09/14/24 1052)  Pulse: 84 (09/14/24 1052)  Resp: 20 (09/14/24 1052)  BP: (!) 164/77 (09/14/24 1052)  SpO2: 96 % (09/14/24 1052)    Vital Signs Range (Last 24H):  Temp:  [97.6 °F (36.4 °C)-98.2 °F (36.8 °C)]   Pulse:  [74-87]   Resp:  [10-23]   BP: (133-177)/(66-83)   SpO2:  [93 %-98 %]        Physical Exam  Vitals reviewed.   Constitutional:       General: He is not in acute  distress.  HENT:      Head: Normocephalic and atraumatic.   Cardiovascular:      Rate and Rhythm: Normal rate and regular rhythm.   Pulmonary:      Effort: Pulmonary effort is normal. No respiratory distress.   Skin:     General: Skin is warm and dry.   Neurological:      Mental Status: He is alert.              Neurological Exam:   LOC: alert  Attention Span: Good   Language: No aphasia  Articulation: Dysarthria  Orientation: Person, Place, Time   Visual Fields: Full  EOM (CN III, IV, VI): Full/intact  Facial Movement (CN VII): Lower facial weakness on the Right  Motor: Arm left  Normal 5/5  Leg left  Normal 5/5  Arm right  Paresis: 4/5  Leg right Paresis: 2/5  Sensation: Intact to light touch, temperature and vibration  Tone: Normal tone throughout      Laboratory:  CMP:   Recent Labs   Lab 09/14/24  0506   CALCIUM 9.5   ALBUMIN 3.3*   PROT 6.5      K 3.4*   CO2 25      BUN 30*   CREATININE 0.9   ALKPHOS 101   ALT 19   AST 13   BILITOT 0.7     CBC:   Recent Labs   Lab 09/14/24  0506   WBC 8.30   RBC 4.00*   HGB 12.6*   HCT 39.1*      MCV 98   MCH 31.5*   MCHC 32.2     Lipid Panel:   Recent Labs   Lab 09/13/24  1249   CHOL 122   LDLCALC 71.0   HDL 29*   TRIG 110     Coagulation:   Recent Labs   Lab 09/14/24  0506   INR 1.0   APTT 29.3     Hgb A1C:   Recent Labs   Lab 09/13/24  2352   HGBA1C 5.2     TSH:   Recent Labs   Lab 09/13/24  1249   TSH 0.787       Diagnostic Results:      Brain imaging:  MRI Brain pending    Vessel Imaging:  CTA multiphase 9/13/24  FINDINGS:  Prominence of ventricles and sulci in keeping with cerebral volume loss.  This appears fairly generalized, without overt lobar predominance. Configuration is not suggestive of hydrocephalus.     Focal hypoattenuation in the left aspect of the harriett concerning for an acute lacunar type infarct.        Additional patchy and confluent regions of hypoattenuation in the supratentorial white matter, nonspecific but most likely reflecting  chronic small vessel ischemic changes. No recent or remote major vascular distribution infarct. No acute hemorrhage.  No mass effect or midline shift.     No extra-axial blood or fluid collections.     The cranium appears intact. Mastoid air cells and paranasal sinuses are essentially clear.     Moderate degenerative change throughout the cervical spine.        CTA:     The aortic arch demonstratesatherosclerotic calcification, but no significant stenosis at the major branch vessel origins.     The right common carotid artery is normal in caliber.  No significant plaque or stenosis at the carotid bifurcation.The right internal carotid artery is normal in caliber.     The left common carotid artery is normal in caliber. Calcification without significant stenosis at the carotid bifurcation.The left internal carotid artery is normal in caliber.     The cervical right vertebral artery is normal in caliber.  Mild plaque in narrowing in the intracranial segment.     Cervical segment of the left vertebral artery is normal in caliber.  Mild plaque in narrowing in the intracranial segment     Scattered atherosclerotic irregularity and narrowing throughout the basilar artery     Moderate focal stenosis at the origin of the left PCA.     Moderate focal stenosis in the distal P1 segment of the right PCA.     Mild focal narrowing at the origin of the right MCA.  Additional moderate stenosis the origin of the superior and inferior divisions.  Tapered narrowing involving the distal M1 segment of the left MCA with mild narrowing distally.     Proximal ACAs appear within normal limits but there is mild irregularity narrowing distally.     Findings were relayed to the ordering provider (Concha ) via the epic secure chat system at approximately 13:00.    Cardiac Evaluation:   N/A      Eran Ortega MD  Comprehensive Stroke Center  Department of Vascular Neurology   [facility] Neurosurgery [facility])

## 2024-09-14 NOTE — PLAN OF CARE
Problem: Physical Therapy  Goal: Physical Therapy Goal  Description: Goals to be met by: 2024     Patient will increase functional independence with mobility by performin. Supine to sit with Set[place]up [place]  2. Sit to supine with Set[place]up [place]  3. Sit to stand transfer with Supervision  4. Bed to chair transfer with Supervision using Rolling Walker  5. Gait  x 200 feet with Supervision using Rolling Walker.   6. Lower extremity exercise program x15 reps per handout, with supervision    Outcome: Progressing

## 2024-09-14 NOTE — NURSING
Patient Transferred to NPU Room 946       Upon arrival to the floor, patient greeted and oriented to room. Complete head to toe assessment completed per protocol. VSS, see flowsheet for details. Neuro assessment completed. Primary team notified of patient's transfer to floor. All current and transfer orders reviewed/reconciled per protocol. All emergency equipment set up in patient's room. Fall/seizure precautions initiated per providers orders. 4 Eyes skin assessment performed, see below for details. Reviewed assessment and rounding frequency with patient and family. Questions were encouraged and addressed. Repositioned patient for comfort with bed locked in lowest position, side rails up x 3, bed alarm set, and call light within reach. Instructed patient to call staff for mobility/assistance, verbalized understanding. No acute signs of distress noted at this time.       Nurses Note -- 4 Eyes      9/13/2024   11:49 PM      Skin assessed during: Admit      [x] No Altered Skin Integrity Present    [x]Prevention Measures Documented      [] Yes- Altered Skin Integrity Present or Discovered   [] LDA Added if Not in Epic (Describe Wound)   [] New Altered Skin Integrity was Present on Admit and Documented in LDA   [] Wound Image Taken    Wound Care Consulted? No    Attending Nurse:  CONCEPCIÓN Tejeda    Second RN/Staff Member:  CONCEPCIÓN MARX              NIHSS assessment completed on floor arrival. Patient's NIHSS score is 4 at this time. SCDs applied to patient per provider's orders. Gan bedside swallow screen completed per stroke protocol. Patient has PASS gan bedside swallow screen, team notified of results. Stroke book individualized to patient and given to patient upon transfer. Reviewed stroke book with the patient at the bedside, see education flowsheets for details.

## 2024-09-14 NOTE — PLAN OF CARE
[facility] - Emergency Dept  Discharge Assessment    Primary Care Provider: [facility]     Discharge Assessment (most recent)       BRIEF DISCHARGE ASSESSMENT - 09/13/24 212          Discharge Planning    Assessment Type Discharge Planning Assessment (P)      Resource/Environmental Concerns none (P)      Support Systems Spouse/significant other (P)      Equipment Currently Used at Home walker, standard;wheelchair (P)      Current Living Arrangements home (P)    pt is currently receiving services from [facility] inpatient [facility]    Patient/Family Anticipates Transition to inpatient rehabilitation facility (P)      Patient/Family Anticipated Services at Transition none (P)      DME Needed Upon Discharge  none (P)      Discharge Plan A Other (P)    inpatient physical rehab    Discharge Plan B Other (P)    inpatient physical rehab       Physical Activity    On average, how many days per week do you engage in moderate to strenuous exercise (like a brisk walk)? 5 days (P)      On average, how many minutes do you engage in exercise at this level? 60 min (P)         Financial Resource Strain    How hard is it for you to pay for the very basics like food, housing, medical care, and heating? Not very hard (P)         Housing Stability    In the last 12 months, was there a time when you were not able to pay the mortgage or rent on time? No (P)      At any time in the past 12 months, were you homeless or living in a shelter (including now)? No (P)         Transportation Needs    Has the lack of transportation kept you from medical appointments, meetings, work or from getting things needed for daily living? No (P)         Food Insecurity    Within the past 12 months, you worried that your food would run out before you got the money to buy more. Never true (P)      Within the past 12 months, the food you bought just didn't last and you didn't have money to get more. Never true (P)         Stress    Do you feel  stress - tense, restless, nervous, or anxious, or unable to sleep at night because your mind is troubled all the time - these days? Not at all (P)         Social Isolation    How often do you feel lonely or isolated from those around you?  Sometimes (P)         Alcohol Use    Q1: How often do you have a drink containing alcohol? Never (P)      Q2: How many drinks containing alcohol do you have on a typical day when you are drinking? Patient does not drink (P)      Q3: How often do you have six or more drinks on one occasion? Never (P)         Utilities    In the past 12 months has the electric, gas, oil, or water company threatened to shut off services in your home? No (P)         Health Literacy    How often do you need to have someone help you when you read instructions, pamphlets, or other written material from your doctor or pharmacy? Never (P)                      Karuna met pt at bedside, pt is currently receiving inpatient rehab from [facility]. Pt stated he plans to return to rehab at discharge.        Judy Ellison LMSW  Case Management  Emergency Department  [phone]

## 2024-09-14 NOTE — HOSPITAL COURSE
Eran Patel is a 73 year old male with history of aflutter, harriett infarct, depression , ptsd, gerd, hld, htn, first AV block presented from [facility] for worsening RSW. [facility] pontine [facility]. Discharged to rehab with dapt and atorvastatin. CTA without LVO. CT H without interval changes to prior rickey pontine infarct. His right sided weakness has gradually improved. He is able to lift the right leg antigravity. Return to [facility] with current regimen including dapt, atorvastatin 80 mg, coreg, amlodipine, lisinopril, and diltiazem. Follow up with vascular neurology in 4-6 weeks.

## 2024-09-14 NOTE — ED NOTES
Dr Kern allowed pt to eat dinner. POCT blood glucose ordered by admitting MD, msg sent to team to ask if they still want the POCT glucose at this time or at bedtime. Awaiting response.

## 2024-09-14 NOTE — PLAN OF CARE
Problem: Stroke, Ischemic (Includes Transient Ischemic Attack)  Goal: Optimal Coping  Outcome: Progressing  Goal: Effective Bowel Elimination  Outcome: Progressing  Goal: Optimal Cerebral Tissue Perfusion  Outcome: Progressing  Goal: Optimal Cognitive Function  Outcome: Progressing  Goal: Improved Communication Skills  Outcome: Progressing  Goal: Optimal Functional Ability  Outcome: Progressing  Goal: Optimal Nutrition Intake  Outcome: Progressing  Goal: Effective Oxygenation and Ventilation  Outcome: Progressing  Goal: Improved Sensorimotor Function  Outcome: Progressing  Goal: Safe and Effective Swallow  Outcome: Progressing  Goal: Effective Urinary Elimination  Outcome: Progressing     Problem: Fall Injury Risk  Goal: Absence of Fall and Fall-Related Injury  Outcome: Progressing     Problem: Adult Inpatient Plan of Care  Goal: Plan of Care Review  Outcome: Progressing  Goal: Patient-Specific Goal (Individualized)  Outcome: Progressing  Goal: Absence of Hospital-Acquired Illness or Injury  Outcome: Progressing  Goal: Optimal Comfort and Wellbeing  Outcome: Progressing  Goal: Readiness for Transition of Care  Outcome: Progressing     Problem: Infection  Goal: Absence of Infection Signs and Symptoms  Outcome: Progressing     Problem: Skin Injury Risk Increased  Goal: Skin Health and Integrity  Outcome: Progressing     Problem: Diabetes Comorbidity  Goal: Blood Glucose Level Within Targeted Range  Outcome: Progressing

## 2024-09-14 NOTE — SUBJECTIVE & OBJECTIVE
Interval History: Pending CT H. Will return to [facility].      Past Medical History:   Diagnosis Date    Diabetes mellitus     High cholesterol     Hypertension     Neuropathy     PTSD (post-traumatic stress disorder)      History reviewed. No pertinent surgical history.  Social History     Tobacco Use    Smoking status: Former    Smokeless tobacco: Never   Substance Use Topics    Alcohol use: Yes    Drug use: No     Review of patient's allergies indicates:  No Known Allergies    Medications: I have reviewed the current medication administration record.    Medications Prior to Admission   Medication Sig Dispense Refill Last Dose    acetaminophen (TYLENOL) 650 MG TbSR Take 650 mg by mouth every 6 (six) hours as needed.       amlodipine (NORVASC) 10 MG tablet Take 5 mg by mouth once daily.       aspirin (ECOTRIN) 81 MG EC tablet Take 81 mg by mouth once daily.       atorvastatin (LIPITOR) 80 MG tablet Take 80 mg by mouth once daily.       buPROPion (WELLBUTRIN SR) 150 MG TBSR 12 hr tablet Take 150 mg by mouth 2 (two) times daily.       carvediloL (COREG) 12.5 MG tablet Take 12.5 mg by mouth every morning.       carvediloL (COREG) 25 MG tablet Take 25 mg by mouth every evening.       clopidogreL (PLAVIX) 75 mg tablet Take 75 mg by mouth once daily.       cyanocobalamin (VITAMIN B-12) 1000 MCG tablet Take 100 mcg by mouth once daily.       diltiaZEM (CARDIZEM LA) 120 mg 24 hr tablet Take 1 tablet (120 mg total) by mouth once daily. 30 tablet 0     fluoxetine (PROZAC) 20 MG capsule Take 60 mg by mouth once daily.       gabapentin (NEURONTIN) 100 MG capsule Take 200 mg by mouth 2 (two) times daily.       hydroCHLOROthiazide (HYDRODIURIL) 12.5 MG Tab Take 12.5 mg by mouth once daily.       lisinopriL (PRINIVIL,ZESTRIL) 20 MG tablet Take 20 mg by mouth once daily.       loperamide (IMODIUM A-D) 2 mg Tab Take 2 mg by mouth 3 (three) times daily as needed.       melatonin 1 mg TbDL Take 6 mg by mouth nightly as needed.        Problem: Adult Mental Health  Goal: Denies having suicidal or homicidal plans  Outcome: Outcome Not Met, Continue to Monitor  Pt admitted with SI. Preform 15 min checks and implement and provide safe environment.        metformin (GLUCOPHAGE) 1000 MG tablet Take 1,000 mg by mouth 2 (two) times daily with meals.       mirabegron (MYRBETRIQ) 25 mg Tb24 ER tablet Take 25 mg by mouth once daily.       multivitamin (THERAGRAN) per tablet Take 1 tablet by mouth once daily.       QUEtiapine (SEROQUEL) 25 MG Tab Take 50 mg by mouth every evening.       senna-docusate 8.6-50 mg (SENNA WITH DOCUSATE SODIUM) 8.6-50 mg per tablet Take 2 tablets by mouth daily as needed for Constipation.       traZODone (DESYREL) 50 MG tablet Take 25 mg by mouth nightly as needed for Insomnia.       vitamin D (VITAMIN D3) 1000 units Tab Take 2,000 Units by mouth once daily.          Review of Systems   Constitutional:  Negative for fever.   HENT:  Negative for congestion and sore throat.    Genitourinary:  Negative for dysuria.   Neurological:  Positive for facial asymmetry, speech difficulty and weakness. Negative for numbness.     Objective:     Vital Signs (Most Recent):  Temp: 98.1 °F (36.7 °C) (09/14/24 1052)  Pulse: 84 (09/14/24 1052)  Resp: 20 (09/14/24 1052)  BP: (!) 164/77 (09/14/24 1052)  SpO2: 96 % (09/14/24 1052)    Vital Signs Range (Last 24H):  Temp:  [97.6 °F (36.4 °C)-98.2 °F (36.8 °C)]   Pulse:  [74-87]   Resp:  [10-23]   BP: (133-177)/(66-83)   SpO2:  [93 %-98 %]        Physical Exam  Vitals reviewed.   Constitutional:       General: He is not in acute distress.  HENT:      Head: Normocephalic and atraumatic.   Cardiovascular:      Rate and Rhythm: Normal rate and regular rhythm.   Pulmonary:      Effort: Pulmonary effort is normal. No respiratory distress.   Skin:     General: Skin is warm and dry.   Neurological:      Mental Status: He is alert.              Neurological Exam:   LOC: alert  Attention Span: Good   Language: No aphasia  Articulation: Dysarthria  Orientation: Person, Place, Time   Visual Fields: Full  EOM (CN III, IV, VI): Full/intact  Facial Movement (CN VII): Lower facial weakness on the Right  Motor: Arm left  Normal  5/5  Leg left  Normal 5/5  Arm right  Paresis: 4/5  Leg right Paresis: 2/5  Sensation: Intact to light touch, temperature and vibration  Tone: Normal tone throughout      Laboratory:  CMP:   Recent Labs   Lab 09/14/24  0506   CALCIUM 9.5   ALBUMIN 3.3*   PROT 6.5      K 3.4*   CO2 25      BUN 30*   CREATININE 0.9   ALKPHOS 101   ALT 19   AST 13   BILITOT 0.7     CBC:   Recent Labs   Lab 09/14/24  0506   WBC 8.30   RBC 4.00*   HGB 12.6*   HCT 39.1*      MCV 98   MCH 31.5*   MCHC 32.2     Lipid Panel:   Recent Labs   Lab 09/13/24  1249   CHOL 122   LDLCALC 71.0   HDL 29*   TRIG 110     Coagulation:   Recent Labs   Lab 09/14/24  0506   INR 1.0   APTT 29.3     Hgb A1C:   Recent Labs   Lab 09/13/24  2352   HGBA1C 5.2     TSH:   Recent Labs   Lab 09/13/24  1249   TSH 0.787       Diagnostic Results:      Brain imaging:  MRI Brain pending    Vessel Imaging:  CTA multiphase 9/13/24  FINDINGS:  Prominence of ventricles and sulci in keeping with cerebral volume loss.  This appears fairly generalized, without overt lobar predominance. Configuration is not suggestive of hydrocephalus.     Focal hypoattenuation in the left aspect of the harriett concerning for an acute lacunar type infarct.        Additional patchy and confluent regions of hypoattenuation in the supratentorial white matter, nonspecific but most likely reflecting chronic small vessel ischemic changes. No recent or remote major vascular distribution infarct. No acute hemorrhage.  No mass effect or midline shift.     No extra-axial blood or fluid collections.     The cranium appears intact. Mastoid air cells and paranasal sinuses are essentially clear.     Moderate degenerative change throughout the cervical spine.        CTA:     The aortic arch demonstratesatherosclerotic calcification, but no significant stenosis at the major branch vessel origins.     The right common carotid artery is normal in caliber.  No significant plaque or stenosis at the  carotid bifurcation.The right internal carotid artery is normal in caliber.     The left common carotid artery is normal in caliber. Calcification without significant stenosis at the carotid bifurcation.The left internal carotid artery is normal in caliber.     The cervical right vertebral artery is normal in caliber.  Mild plaque in narrowing in the intracranial segment.     Cervical segment of the left vertebral artery is normal in caliber.  Mild plaque in narrowing in the intracranial segment     Scattered atherosclerotic irregularity and narrowing throughout the basilar artery     Moderate focal stenosis at the origin of the left PCA.     Moderate focal stenosis in the distal P1 segment of the right PCA.     Mild focal narrowing at the origin of the right MCA.  Additional moderate stenosis the origin of the superior and inferior divisions.  Tapered narrowing involving the distal M1 segment of the left MCA with mild narrowing distally.     Proximal ACAs appear within normal limits but there is mild irregularity narrowing distally.     Findings were relayed to the ordering provider (Concha ) via the epic secure chat system at approximately 13:00.    Cardiac Evaluation:   N/A

## 2024-09-14 NOTE — ASSESSMENT & PLAN NOTE
Eran Patel is a 73 y.o. male with PMHx of aflutter, harriett infarct, depssion, PTSD, GERD, HLD, HTN, DM, and first degree AV block who presented to ED from [facility] for worsening RSW. [facility] with harriett infarct. He was discharged to rehab on DAPT and atorvastatin. Per patient, he woke up with worsening RSW this morning. Stroke code activated on ED arrival. Patient last known normal yesterday at 10pm. CTA confirmed L harriett infarct, no LVO. Patient had some improvement in strength on the R side while in ED. Attempting to obtain repeat MRI however will need to obtain info on patient's penile implant prior to scan. Will admit to vascular neurology for observation.    Antithrombotics for secondary stroke prevention: Antiplatelets: Aspirin: 81 mg daily  Clopidogrel: 75 mg daily    Statins for secondary stroke prevention and hyperlipidemia, if present:   Statins: Atorvastatin- 80 mg daily    Aggressive risk factor modification: HTN, DM, HLD     Rehab efforts: The patient has been evaluated by a stroke team provider and the therapy needs have been fully considered based off the presenting complaints and exam findings. The following therapy evaluations are needed: PT evaluate and treat, OT evaluate and treat, SLP evaluate and treat, PM&R evaluate for appropriate placement    Diagnostics ordered/pending: HgbA1C to assess blood glucose levels, MRI head without contrast to assess brain parenchyma    VTE prophylaxis: Heparin 5000 units SQ every 8 hours  Mechanical prophylaxis: Place SCDs    BP parameters: Infarct: No intervention, SBP <220

## 2024-09-14 NOTE — PT/OT/SLP EVAL
Physical Therapy Co-Evaluation    Patient Name:  Eran Patel   MRN:  [id number]    Recommendations:     Discharge Recommendations: High Intensity Therapy   Discharge Equipment Recommendations: walker, rolling   Barriers to discharge: None    Assessment:     Eran Patel is a 73 y.o. 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  He presents with the following impairments/functional limitations: weakness, impaired endurance, impaired self care skills, impaired functional mobility, gait instability, impaired balance, decreased safety awareness Pt. cooperative and tolerated treatment fairly well, but continues to have (R) LE buckling during gait. Pt. progressing with mobility with RW and CGA.    Rehab Prognosis: Good; patient would benefit from acute skilled PT services to address these deficits and reach maximum level of function.    Recent Surgery: * No surgery found *      Plan:     During this hospitalization, patient to be seen 4 x/week to address the identified rehab impairments via gait training, therapeutic activities, therapeutic exercises, neuromuscular re-education and progress toward the following goals:    Plan of Care Expires:  10/14/24    Subjective     Chief Complaint: (R) LE weakness  Patient/Family Comments/goals: pt. Agreeable to PT  Pain/Comfort:  Pain Rating 1: 0/10  Pain Rating Post-Intervention 1: 0/10    Patients cultural, spiritual, Bahai conflicts given the current situation: no    Living Environment:  Pt. Lives with spouse in [facility] with ramp access.  Prior to admission, patients level of function was indep.  Equipment used at home: walker, standard, wheelchair.  Upon discharge, patient will have assistance from spouse.    Objective:     Communicated with nursing prior to session.  Patient found up in chair with chair check, peripheral IV  upon PT entry to room.    General Precautions: Standard, fall  Orthopedic Precautions:N/A   Braces:  N/A  Respiratory Status: Room air    Exams:  RLE ROM: WFL  RLE Strength: WFL  LLE ROM: WFL  LLE Strength: WFL    Functional Mobility:  Transfers:     Sit to Stand:  contact guard assistance with rolling walker  Bed to Chair: contact guard assistance with  rolling walker  using  Stand Pivot  Gait: 120' with RW and CGA with VCs for safety. Pt. running into door frame on (R) side during gait, but able to self-correct. Pt. with (R) LE buckling during gait, but pt. able to maintain balance with CGA.  Balance: fair      AM-PAC 6 CLICK MOBILITY  Total Score:20       Treatment & Education:  Discussed therapy needs, goals, safety with mobility, and POC. Assisted pt. to/from bathroom and completed ADLs at sink with RW and CGA.    Patient left up in chair with all lines intact and call button in reach.    GOALS:   Multidisciplinary Problems       Physical Therapy Goals          Problem: Physical Therapy    Goal Priority Disciplines Outcome Goal Variances Interventions   Physical Therapy Goal     PT, PT/OT Progressing     Description: Goals to be met by: 2024     Patient will increase functional independence with mobility by performin. Supine to sit with Set-up Erin  2. Sit to supine with Set-up Erin  3. Sit to stand transfer with Supervision  4. Bed to chair transfer with Supervision using Rolling Walker  5. Gait  x 200 feet with Supervision using Rolling Walker.   6. Lower extremity exercise program x15 reps per handout, with supervision                         History:     Past Medical History:   Diagnosis Date    Diabetes mellitus     High cholesterol     Hypertension     Neuropathy     PTSD (post-traumatic stress disorder)        History reviewed. No pertinent surgical history.    Time Tracking:     PT Received On: 24  PT Start Time: 901     PT Stop Time: 928  PT Total Time (min): 27 min     Billable Minutes: Evaluation 12 and Gait Training 15      2024

## 2024-09-14 NOTE — PT/OT/SLP EVAL
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  He presents with the following performance deficits affecting function: weakness, impaired self care skills, impaired functional mobility, impaired balance, decreased coordination, decreased safety awareness, impaired coordination.      Pt was found up in chair and demonstrated an overall good tolerance of session on this date. Pt demonstrated ability to raise BUE over head, with a slight deficit observed in RUE. Mild R side inattention was observed throughout session. Pt required assist to don/doff gown behind back. CGA was required to perform sit <--> stand transfer, ambulate down hallway, and to complete grooming task. Pt was able to brush teeth with slight instability (swaying) observed while standing with RW at sink. Pt would benefit from continued skilled acute OT services during this admission in order to maximize independence and safety with ADLs and functional mobility to ensure safe return to PLOF in the least restrictive environment. Patient presents with good participation and motivation to return to prior level of function with high intensity therapy. The patient demonstrates ability participate in up to 3 hours or 15hrs of combined therapy post acute.     Rehab Prognosis: Good; patient would benefit from acute skilled OT services to address these deficits and reach maximum level of function.       Plan:     Patient to be seen 4 x/week to address the above listed problems via self-care/home management, therapeutic activities, therapeutic  exercises, neuromuscular re-education  Plan of Care Expires: 10/14/24  Plan of Care Reviewed with: spouse, patient    Subjective     Chief Complaint: ready to go home  Patient/Family Comments/goals: Return to PLOF    Occupational Profile:  Living Environment: lives with spouse in a [facility] with ramp entrance and a walk-in shower  Previous level of function: not driving, used standard walker, no other AD    Roles and Routines: retired  Equipment Used at Home: walker, standard  Assistance upon Discharge: family     Pain/Comfort:  Pain Rating 1: 0/10    Patients cultural, spiritual, Advent conflicts given the current situation: no    Objective:   Co-evaluation/treatment performed due to patient's multiple deficits requiring two skilled therapists to appropriately and safely assess patient's strength and endurance while facilitating functional tasks in addition to accommodating for patient's activity tolerance.      Communicated with: RN prior to session.  Patient found up in chair with chair check upon OT entry to room.    General Precautions: Standard, fall  Orthopedic Precautions: N/A  Braces: N/A  Respiratory Status: Room air    Occupational Performance:    Functional Mobility/Transfers:  Patient completed Sit <> Stand Transfer with contact guard assistance  with  rolling walker   Functional Mobility: Pt engaging in functional mobility to simulate household/community distances approx ~120 ft. with CGA and utilizing RW in order to maximize functional activity tolerance and standing balance required for engagement in occupations of choice.     Activities of Daily Living:  Grooming: contact guard assistance to brush teeth while standing at sink with in bathroom with RW  Upper Body Dressing: minimum assistance to don gown around back    Cognitive/Visual Perceptual:  Cognitive/Psychosocial Skills:     -       Follows Commands/attention:Follows multistep  commands  -       Communication: clear/fluent  -       Safety  awareness/insight to disability: mildly impaired   Visual/Perceptual:      -Intact able to complete task of bring finger from nose <--> SOT's finger in varying visual fields and was able to identify number of SOT's fingers in peripheral     Physical Exam:  Balance:  Static Sitting   supervision   Dynamic Sitting   supervision   Static Standing   contact guard assistance   Dynamic Standing   contact guard assistance     Upper Extremity Function:    Left UE Right UE   UE Edema None noted None noted   UE ROM WNL Deficits: slightly impaired   UE Strength WNL Deficits: slightly impaired    Strength WNL Deficits: slightly impaired   Sensation    -       Intact    -       Intact   Fine Motor Skills:     -       Intact able to complete task of bring finger from nose <--> SOT's finger     -       Intact able to complete task of bring finger from nose <--> SOT's finger in varying   Gross Motor Skills:   WFL   RSW        AMPAC 6 Click ADL:  AMPAC Total Score: 19    Treatment & Education:  Therapist provided facilitation and instruction of proper body mechanics and fall prevention strategies during tasks listed above.  Instructed patient to sit in bedside chair daily to increase OOB/activity tolerance.  Instructed patient to use call light to have nursing staff assist with needs/transfers.  Discussed OT POC and answered all questions within OT scope of practice.     Patient left up in chair with call button in reach, chair alarm on, RN notified, and spouse present    GOALS:   Multidisciplinary Problems       Occupational Therapy Goals          Problem: Occupational Therapy    Goal Priority Disciplines Outcome Interventions   Occupational Therapy Goal     OT, PT/OT Progressing    Description: Goals to be met by: 10/14/2024     Patient will increase functional independence with ADLs by performing:    Feeding with [place].  UE Dressing with [place].  LE Dressing with [place].  Grooming while standing with  [place].  Toileting from toilet with [place] for hygiene and clothing management.   Bathing from  tub bench with [place].  Increased functional strength to WNL for RUE.  Pt will demonstrated attention to R side 100% of the time with no cues.                          History:     Past Medical History:   Diagnosis Date    Diabetes mellitus     High cholesterol     Hypertension     Neuropathy     PTSD (post-traumatic stress disorder)        History reviewed. No pertinent surgical history.    Time Tracking:     OT Date of Treatment: 09/14/24  OT Start Time: 0901  OT Stop Time: 0928  OT Total Time (min): 27 min    Billable Minutes:Evaluation 10  Self Care/Home Management 17    9/14/2024

## 2024-09-14 NOTE — PLAN OF CARE
Problem: Occupational Therapy  Goal: Occupational Therapy Goal  Description: Goals to be met by: 10/14/2024     Patient will increase functional independence with ADLs by performing:    UE Dressing with [place].  LE Dressing with [place].  Grooming while standing with [place].  Toileting from toilet with [place] for hygiene and clothing management.   Bathing from  tub bench with [place].  Increased functional strength to WNL for RUE.  Pt will demonstrate attention to R side 100% of the time with no cues for safety awareness.    Outcome: Progressing

## 2024-09-14 NOTE — CONSULTS
[facility] - Emergency Dept  Vascular Neurology  [facility] Stroke Center  Consult Note    Inpatient consult to Vascular (Stroke) Neurology  Consult performed by: Nancy Stahl PA-C  Consult ordered by: Kirstin Ocampo MD        Assessment/Plan:     Patient is a 73 y.o. 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 male with PMHx of aflutter, harriett infarct, depssion, PTSD, GERD, HLD, HTN, DM, and first degree AV block who presented to ED from [facility] for worsening RSW. [facility]riett [facility]. He was discharged to rehab on DAPT and atorvastatin. Per patient, he woke up with worsening RSW this morning. Stroke code activated on ED arrival. Patient last known normal yesterday at 10pm. CTA confirmed L harriett infarct, no LVO. Patient had some improvement in strength on the R side while in ED. Attempting to obtain repeat MRI however will need to obtain info on patient's penile implant prior to scan. Will admit to vascular neurology for observation.    Antithrombotics for secondary stroke prevention: Antiplatelets: Aspirin: 81 mg daily  Clopidogrel: 75 mg daily    Statins for secondary stroke prevention and hyperlipidemia, if present:   Statins: Atorvastatin- 80 mg daily    Aggressive risk factor modification: HTN, DM, HLD     Rehab efforts: The patient has been evaluated by a stroke team provider and the therapy needs have been fully considered based off the presenting complaints and exam findings. The following therapy evaluations are needed: PT evaluate and treat, OT evaluate and treat, SLP evaluate and treat, PM&R evaluate for appropriate placement    Diagnostics ordered/pending: HgbA1C to assess blood glucose levels, MRI head without contrast to assess brain parenchyma    VTE prophylaxis: Heparin 5000 units SQ every 8 hours  Mechanical prophylaxis: Place SCDs    BP parameters: Infarct: No intervention, SBP <220        Type 2  diabetes mellitus without complication, without long-term current use of insulin  Stroke risk factor  A1C pending  Hold home metformin  Glucose 140-180  SSI    Depression  Continue home meds    PTSD (post-traumatic stress disorder)  Continue home meds    Mixed hyperlipidemia  Stroke risk factor  Continue atorvastatin 80    Primary hypertension  Stroke risk factor  SBP <220  Holding home BP meds except for carvedilol    Atrial flutter  Stroke risk factor  Confirmed on EKG in 2017  Not currently on anticoagulation        STROKE DOCUMENTATION     Acute Stroke Times   Last Known Normal Date: 09/12/24  Last Known Normal Time: 2200  Unknown Symptom Onset Date: Unknown Date  Unknown Symptom Onset Time: Unknown Time  Stroke Team Called Date: 09/13/24  Stroke Team Called Time: 1237  Stroke Team Arrival Date: 09/13/24  Stroke Team Arrival Time: 1244  CT Interpretation Time: 1247  Thrombolytic Therapy Recommended: No  CTA Interpretation Time: 1249  Thrombectomy Recommended: No    NIH Scale:  Interval: baseline  1a. Level of Consciousness: 0-->Alert, keenly responsive  1b. LOC Questions: 0-->Answers both questions correctly  1c. LOC Commands: 0-->Performs both tasks correctly  2. Best Gaze: 0-->Normal  3. Visual: 0-->No visual loss  4. Facial Palsy: 1-->Minor paralysis (flattened nasolabial fold, asymmetry on smiling)  5a. Motor Arm, Left: 0-->No drift, limb holds 90 (or 45) degrees for full 10 secs  5b. Motor Arm, Right: 1-->Drift, limb holds 90 (or 45) degrees, but drifts down before full 10 secs, does not hit bed or other support  6a. Motor Leg, Left: 0-->No drift, leg holds 30 degree position for full 5 secs  6b. Motor Leg, Right: 3-->No effort against gravity, leg falls to bed immediately  7. Limb Ataxia: 0-->Absent  8. Sensory: 0-->Normal, no sensory loss  9. Best Language: 0-->No aphasia, normal  10. Dysarthria: 1-->Mild-to-moderate dysarthria, patient slurs at least some words and, at worst, can be understood with some  difficulty  11. Extinction and Inattention (formerly Neglect): 0-->No abnormality  Total (NIH Stroke Scale): 6    Modified Mason Score: 3  Greg Coma Scale:    ABCD2 Score:    VMDM2TH1-TFR Score:   HAS -BLED Score:   ICH Score:   Hunt & Guerrero Classification:       Thrombolysis Candidate? No, Recent moderate to severe stroke (< 3 months)    Delays to Thrombolysis?  Not Applicable    Interventional Revascularization Candidate?   Is the patient eligible for mechanical endovascular reperfusion (JOHN)?  No; No large vessel occlusion identified on imaging     Delays to Thrombectomy? Not Applicable    Hemorrhagic change of an Ischemic Stroke: Does this patient have an ischemic stroke with hemorrhagic changes? No     Subjective:     History of Present Illness:  Eran Patel is a 73 y.o. male with PMHx of aflutter, harriett infarct, depssion, PTSD, GERD, HLD, HTN, and first degree AV block who presented to ED from [facility] for worsening RSW. [facility] harriett infarct. He was discharged to rehab on DAPT and atorvastatin. Per patient, he woke up with worsening RSW this morning. Stroke code activated on ED arrival. Patient last known normal yesterday at 10pm.                Past Medical History:   Diagnosis Date    Diabetes mellitus     High cholesterol     Hypertension     Neuropathy     PTSD (post-traumatic stress disorder)      No past surgical history on file.  Social History     Tobacco Use    Smoking status: Former    Smokeless tobacco: Never   Substance Use Topics    Alcohol use: Yes    Drug use: No     Review of patient's allergies indicates:  No Known Allergies    Medications: I have reviewed the current medication administration record.    (Not in a hospital admission)      Review of Systems   Constitutional:  Negative for fever.   HENT:  Negative for congestion and sore throat.    Genitourinary:  Negative for dysuria.   Neurological:  Positive for facial asymmetry, speech difficulty  "and weakness. Negative for numbness.     Objective:     Vital Signs (Most Recent):  Temp: 98.1 °F (36.7 °C) (09/13/24 1236)  Pulse: 86 (09/13/24 1236)  Resp: 18 (09/13/24 1236)  BP: (!) 153/78 (09/13/24 1236)  SpO2: 97 % (09/13/24 1236)    Vital Signs Range (Last 24H):  Temp:  [98.1 °F (36.7 °C)]   Pulse:  [86]   Resp:  [18]   BP: (153)/(78)   SpO2:  [97 %]        Physical Exam  Vitals reviewed.   Constitutional:       General: He is not in acute distress.  HENT:      Head: Normocephalic and atraumatic.   Cardiovascular:      Rate and Rhythm: Normal rate and regular rhythm.   Pulmonary:      Effort: Pulmonary effort is normal. No respiratory distress.   Skin:     General: Skin is warm and dry.   Neurological:      Mental Status: He is alert.              Neurological Exam:   LOC: alert  Attention Span: Good   Language: No aphasia  Articulation: Dysarthria  Orientation: Person, Place, Time   Visual Fields: Full  EOM (CN III, IV, VI): Full/intact  Facial Movement (CN VII): Lower facial weakness on the Right  Motor: Arm left  Normal 5/5  Leg left  Normal 5/5  Arm right  Paresis: 4/5  Leg right Paresis: 2/5  Sensation: Intact to light touch, temperature and vibration  Tone: Normal tone throughout      Laboratory:  CMP: No results for input(s): "GLUCOSE", "CALCIUM", "ALBUMIN", "PROT", "NA", "K", "CO2", "CL", "BUN", "CREATININE", "ALKPHOS", "ALT", "AST", "BILITOT" in the last 24 hours.  CBC:   Recent Labs   Lab 09/13/24  1249   WBC 10.84   RBC 4.07*   HGB 13.0*   HCT 40.3      MCV 99*   MCH 31.9*   MCHC 32.3     Lipid Panel: No results for input(s): "CHOL", "LDLCALC", "HDL", "TRIG" in the last 168 hours.  Coagulation:   Recent Labs   Lab 09/13/24  1249   INR 0.9     Hgb A1C: No results for input(s): "HGBA1C" in the last 168 hours.  TSH: No results for input(s): "TSH" in the last 168 hours.    Diagnostic Results:      Brain imaging:  MRI Brain pending    Vessel Imaging:  CTA multiphase 9/13/24  FINDINGS:  Prominence " of ventricles and sulci in keeping with cerebral volume loss.  This appears fairly generalized, without overt lobar predominance. Configuration is not suggestive of hydrocephalus.     Focal hypoattenuation in the left aspect of the harriett concerning for an acute lacunar type infarct.        Additional patchy and confluent regions of hypoattenuation in the supratentorial white matter, nonspecific but most likely reflecting chronic small vessel ischemic changes. No recent or remote major vascular distribution infarct. No acute hemorrhage.  No mass effect or midline shift.     No extra-axial blood or fluid collections.     The cranium appears intact. Mastoid air cells and paranasal sinuses are essentially clear.     Moderate degenerative change throughout the cervical spine.        CTA:     The aortic arch demonstratesatherosclerotic calcification, but no significant stenosis at the major branch vessel origins.     The right common carotid artery is normal in caliber.  No significant plaque or stenosis at the carotid bifurcation.The right internal carotid artery is normal in caliber.     The left common carotid artery is normal in caliber. Calcification without significant stenosis at the carotid bifurcation.The left internal carotid artery is normal in caliber.     The cervical right vertebral artery is normal in caliber.  Mild plaque in narrowing in the intracranial segment.     Cervical segment of the left vertebral artery is normal in caliber.  Mild plaque in narrowing in the intracranial segment     Scattered atherosclerotic irregularity and narrowing throughout the basilar artery     Moderate focal stenosis at the origin of the left PCA.     Moderate focal stenosis in the distal P1 segment of the right PCA.     Mild focal narrowing at the origin of the right MCA.  Additional moderate stenosis the origin of the superior and inferior divisions.  Tapered narrowing involving the distal M1 segment of the left MCA with  mild narrowing distally.     Proximal ACAs appear within normal limits but there is mild irregularity narrowing distally.     Findings were relayed to the ordering provider (Concha ) via the epic secure chat system at approximately 13:00.    Cardiac Evaluation:   N/A      Nancy Stahl PA-C  [facility] Stroke [facility]  Department of Vascular Neurology   [facility] - Emergency Dept

## 2024-09-14 NOTE — CONSULTS
Inpatient consult to Physical Medicine Rehab  Consult performed by: Jewels York NP  Consult ordered by: Nancy Stahl PA-C        Consult received.      Jewels York NP  Physical Medicine & Rehabilitation   09/14/2024

## 2024-09-14 NOTE — PLAN OF CARE
Problem: Fall Injury Risk  Goal: Absence of Fall and Fall-Related Injury  Outcome: Progressing  Intervention: Promote Injury-Free Environment  Flowsheets (Taken 9/14/2024 5739)  Safety Promotion/Fall Prevention:   assistive device/personal item within reach   bed alarm set

## 2024-09-15 VITALS
HEIGHT: 70 IN | HEART RATE: 75 BPM | TEMPERATURE: 98 F | OXYGEN SATURATION: 95 % | WEIGHT: 200 LBS | BODY MASS INDEX: 28.63 KG/M2 | SYSTOLIC BLOOD PRESSURE: 187 MMHG | DIASTOLIC BLOOD PRESSURE: 86 MMHG | RESPIRATION RATE: 18 BRPM

## 2024-09-15 PROBLEM — Z74.09 REDUCED MOBILITY: Status: ACTIVE | Noted: 2024-09-15

## 2024-09-15 PROBLEM — E87.6 HYPOKALEMIA: Status: ACTIVE | Noted: 2024-09-15

## 2024-09-15 PROBLEM — D68.59 HYPERCOAGULABLE STATE: Status: ACTIVE | Noted: 2024-09-15

## 2024-09-15 PROBLEM — G83.30: Status: ACTIVE | Noted: 2024-09-15

## 2024-09-15 LAB
ALBUMIN SERPL BCP-MCNC: 3.2 G/DL (ref 3.5–5.2)
ALP SERPL-CCNC: 99 U/L (ref 55–135)
ALT SERPL W/O P-5'-P-CCNC: 17 U/L (ref 10–44)
ANION GAP SERPL CALC-SCNC: 8 MMOL/L (ref 8–16)
AST SERPL-CCNC: 13 U/L (ref 10–40)
BASOPHILS # BLD AUTO: 0.05 K/UL (ref 0–0.2)
BASOPHILS NFR BLD: 0.6 % (ref 0–1.9)
BILIRUB SERPL-MCNC: 0.7 MG/DL (ref 0.1–1)
BUN SERPL-MCNC: 20 MG/DL (ref 8–23)
CALCIUM SERPL-MCNC: 9.4 MG/DL (ref 8.7–10.5)
CHLORIDE SERPL-SCNC: 109 MMOL/L (ref 95–110)
CO2 SERPL-SCNC: 24 MMOL/L (ref 23–29)
CREAT SERPL-MCNC: 0.9 MG/DL (ref 0.5–1.4)
DIFFERENTIAL METHOD BLD: ABNORMAL
EOSINOPHIL # BLD AUTO: 0.4 K/UL (ref 0–0.5)
EOSINOPHIL NFR BLD: 4.6 % (ref 0–8)
ERYTHROCYTE [DISTWIDTH] IN BLOOD BY AUTOMATED COUNT: 13 % (ref 11.5–14.5)
EST. GFR  (NO RACE VARIABLE): >60 ML/MIN/1.73 M^2
GLUCOSE SERPL-MCNC: 117 MG/DL (ref 70–110)
HCT VFR BLD AUTO: 37.9 % (ref 40–54)
HGB BLD-MCNC: 12.3 G/DL (ref 14–18)
IMM GRANULOCYTES # BLD AUTO: 0.02 K/UL (ref 0–0.04)
IMM GRANULOCYTES NFR BLD AUTO: 0.2 % (ref 0–0.5)
LYMPHOCYTES # BLD AUTO: 2.6 K/UL (ref 1–4.8)
LYMPHOCYTES NFR BLD: 31.3 % (ref 18–48)
MCH RBC QN AUTO: 31.3 PG (ref 27–31)
MCHC RBC AUTO-ENTMCNC: 32.5 G/DL (ref 32–36)
MCV RBC AUTO: 96 FL (ref 82–98)
MONOCYTES # BLD AUTO: 1.2 K/UL (ref 0.3–1)
MONOCYTES NFR BLD: 14.4 % (ref 4–15)
NEUTROPHILS # BLD AUTO: 4.1 K/UL (ref 1.8–7.7)
NEUTROPHILS NFR BLD: 48.9 % (ref 38–73)
NRBC BLD-RTO: 0 /100 WBC
PLATELET # BLD AUTO: 226 K/UL (ref 150–450)
PMV BLD AUTO: 11.6 FL (ref 9.2–12.9)
POCT GLUCOSE: 127 MG/DL (ref 70–110)
POCT GLUCOSE: 132 MG/DL (ref 70–110)
POCT GLUCOSE: 181 MG/DL (ref 70–110)
POTASSIUM SERPL-SCNC: 3.3 MMOL/L (ref 3.5–5.1)
PROT SERPL-MCNC: 6.5 G/DL (ref 6–8.4)
RBC # BLD AUTO: 3.93 M/UL (ref 4.6–6.2)
SODIUM SERPL-SCNC: 141 MMOL/L (ref 136–145)
WBC # BLD AUTO: 8.4 K/UL (ref 3.9–12.7)

## 2024-09-15 PROCEDURE — 36415 COLL VENOUS BLD VENIPUNCTURE: CPT | Performed by: PHYSICIAN ASSISTANT

## 2024-09-15 PROCEDURE — 99233 SBSQ HOSP IP/OBS HIGH 50: CPT | Mod: ,,, | Performed by: STUDENT IN AN ORGANIZED HEALTH CARE EDUCATION/TRAINING PROGRAM

## 2024-09-15 PROCEDURE — 25000003 PHARM REV CODE 250

## 2024-09-15 PROCEDURE — 92610 EVALUATE SWALLOWING FUNCTION: CPT

## 2024-09-15 PROCEDURE — 85025 COMPLETE CBC W/AUTO DIFF WBC: CPT | Performed by: PHYSICIAN ASSISTANT

## 2024-09-15 PROCEDURE — 80053 COMPREHEN METABOLIC PANEL: CPT | Performed by: PHYSICIAN ASSISTANT

## 2024-09-15 PROCEDURE — 63600175 PHARM REV CODE 636 W HCPCS: Performed by: PHYSICIAN ASSISTANT

## 2024-09-15 PROCEDURE — 25000003 PHARM REV CODE 250: Performed by: PHYSICIAN ASSISTANT

## 2024-09-15 RX ORDER — POTASSIUM CHLORIDE 750 MG/1
30 CAPSULE, EXTENDED RELEASE ORAL 3 TIMES DAILY
Status: DISCONTINUED | OUTPATIENT
Start: 2024-09-15 | End: 2024-09-15 | Stop reason: HOSPADM

## 2024-09-15 RX ORDER — AMLODIPINE BESYLATE 5 MG/1
5 TABLET ORAL DAILY
Status: DISCONTINUED | OUTPATIENT
Start: 2024-09-15 | End: 2024-09-15 | Stop reason: HOSPADM

## 2024-09-15 RX ADMIN — POTASSIUM CHLORIDE 30 MEQ: 10 CAPSULE, COATED, EXTENDED RELEASE ORAL at 09:09

## 2024-09-15 RX ADMIN — BUPROPION HYDROCHLORIDE 150 MG: 150 TABLET, EXTENDED RELEASE ORAL at 09:09

## 2024-09-15 RX ADMIN — FLUOXETINE HYDROCHLORIDE 60 MG: 20 CAPSULE ORAL at 09:09

## 2024-09-15 RX ADMIN — THERA TABS 1 TABLET: TAB at 09:09

## 2024-09-15 RX ADMIN — ASPIRIN 81 MG: 81 TABLET, COATED ORAL at 09:09

## 2024-09-15 RX ADMIN — CYANOCOBALAMIN TAB 1000 MCG 1000 MCG: 1000 TAB at 09:09

## 2024-09-15 RX ADMIN — CLOPIDOGREL BISULFATE 75 MG: 75 TABLET ORAL at 09:09

## 2024-09-15 RX ADMIN — CARVEDILOL 12.5 MG: 12.5 TABLET, FILM COATED ORAL at 06:09

## 2024-09-15 RX ADMIN — CHOLECALCIFEROL TAB 25 MCG (1000 UNIT) 2000 UNITS: 25 TAB at 09:09

## 2024-09-15 RX ADMIN — AMLODIPINE BESYLATE 5 MG: 5 TABLET ORAL at 12:09

## 2024-09-15 RX ADMIN — OXYBUTYNIN CHLORIDE 5 MG: 5 TABLET, EXTENDED RELEASE ORAL at 09:09

## 2024-09-15 RX ADMIN — GABAPENTIN 200 MG: 100 CAPSULE ORAL at 09:09

## 2024-09-15 RX ADMIN — ATORVASTATIN CALCIUM 80 MG: 40 TABLET, FILM COATED ORAL at 09:09

## 2024-09-15 NOTE — DISCHARGE SUMMARY
[facility] - Neurosurgery ([facility])  Vascular Neurology  Comprehensive Stroke Center  Discharge Summary     Summary:     Admit Date: 9/13/2024 12:37 PM    Discharge Date and Time: No discharge date for patient encounter.    Attending Physician: Betty Villalobos MD     Discharge Provider: Eran Ortega MD    History of Present Illness: Eran Patel is a 73 y.o. male with PMHx of aflutter, harriett infarct, depssion, PTSD, GERD, HLD, HTN, and first degree AV block who presented to ED from [facility] for worsening RSW. [facility] harriett [facility]. He was discharged to rehab on DAPT and atorvastatin. Per patient, he woke up with worsening RSW this morning. Stroke code activated on ED arrival. Patient last known normal yesterday at 10pm.          Hospital Course (synopsis of major diagnoses, care, treatment, and services provided during the course of the hospital stay): Eran Patel is a 73 year old male with history of aflutter, harriett infarct, depression , ptsd, gerd, hld, htn, first AV block presented from [facility] for worsening RSW. [facility] with pontine infarct. Discharged to rehab with dapt and atorvastatin. CTA without LVO. CT H without interval changes to prior rickey pontine infarct. His right sided weakness has gradually improved. He is able to lift the right leg antigravity. Return to [facility] with current regimen including dapt, atorvastatin 80 mg, coreg, amlodipine, lisinopril, and diltiazem. Follow up with vascular neurology in 4-6 weeks.     Goals of Care Treatment Preferences:  Code Status: Full Code      Stroke Etiology: TIA Recrudescence of prior stroke symptoms with improved symptoms on discharge.    STROKE DOCUMENTATION   Acute Stroke Times   Last Known Normal Date: 09/12/24  Last Known Normal Time: 2200  Unknown Symptom Onset Date: Unknown Date  Unknown Symptom Onset Time: Unknown Time  Stroke Team Called Date: 09/13/24  Stroke Team Called Time:  1237  Stroke Team Arrival Date: 09/13/24  Stroke Team Arrival Time: 1244  CT Interpretation Time: 1247  Thrombolytic Therapy Recommended: No  CTA Interpretation Time: 1249  Thrombectomy Recommended: No     NIH Scale:           Modified [place] Score: 3  [place] Coma Scale:    ABCD2 Score:    [id number] Score:   HAS -BLED Score:   ICH Score:   Hunt & Guerrero Classification:       Assessment/Plan:     Diagnostic Results:      Brain imaging:  MRI Brain pending     Vessel Imaging:  CTA multiphase 9/13/24  FINDINGS:  Prominence of ventricles and sulci in keeping with cerebral volume loss.  This appears fairly generalized, without overt lobar predominance. Configuration is not suggestive of hydrocephalus.     Focal hypoattenuation in the left aspect of the harriett concerning for an acute lacunar type infarct.        Additional patchy and confluent regions of hypoattenuation in the supratentorial white matter, nonspecific but most likely reflecting chronic small vessel ischemic changes. No recent or remote major vascular distribution infarct. No acute hemorrhage.  No mass effect or midline shift.     No extra-axial blood or fluid collections.     The cranium appears intact. Mastoid air cells and paranasal sinuses are essentially clear.     Moderate degenerative change throughout the cervical spine.        CTA:     The aortic arch demonstratesatherosclerotic calcification, but no significant stenosis at the major branch vessel origins.     The right common carotid artery is normal in caliber.  No significant plaque or stenosis at the carotid bifurcation.The right internal carotid artery is normal in caliber.     The left common carotid artery is normal in caliber. Calcification without significant stenosis at the carotid bifurcation.The left internal carotid artery is normal in caliber.     The cervical right vertebral artery is normal in caliber.  Mild plaque in narrowing in the intracranial segment.     Cervical segment of the  left vertebral artery is normal in caliber.  Mild plaque in narrowing in the intracranial segment     Scattered atherosclerotic irregularity and narrowing throughout the basilar artery     Moderate focal stenosis at the origin of the left PCA.     Moderate focal stenosis in the distal P1 segment of the right PCA.     Mild focal narrowing at the origin of the right MCA.  Additional moderate stenosis the origin of the superior and inferior divisions.  Tapered narrowing involving the distal M1 segment of the left MCA with mild narrowing distally.     Proximal ACAs appear within normal limits but there is mild irregularity narrowing distally.     Findings were relayed to the ordering provider (Concha ) via the epic secure chat system at approximately 13:00.     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 male with PMHx of aflutter, harriett infarct, depssion, PTSD, GERD, HLD, HTN, DM, and first degree AV block who presented to ED from [facility] for worsening RSW. [facility] harriett infarct. He was discharged to rehab on DAPT and atorvastatin. Per patient, he woke up with worsening RSW this morning. Stroke code activated on ED arrival. Patient last known normal yesterday at 10pm. CTA confirmed L harriett infarct, no LVO.  Patient had some improvement in strength on the R side while in ED. Attempting to obtain repeat MRI however will need to obtain info on patient's penile implant prior to scan. Will admit to vascular neurology for observation.    9/15: Return to [facility].    Antithrombotics for secondary stroke prevention: Antiplatelets: Aspirin: 81 mg daily  Clopidogrel: 75 mg daily    Statins for secondary stroke prevention and hyperlipidemia, if present:   Statins: Atorvastatin- 80 mg daily    Aggressive risk factor modification: HTN, DM, HLD     Rehab efforts: The patient has been evaluated by a stroke team provider and the therapy needs have been fully considered based off the presenting complaints and exam findings. The following therapy evaluations are needed: PT evaluate and treat, OT evaluate and treat, SLP evaluate and treat, PM&R evaluate for appropriate placement    Diagnostics ordered/pending: HgbA1C to assess blood glucose levels, MRI head without contrast to assess brain parenchyma    VTE prophylaxis: Heparin 5000 units SQ every 8 hours  Mechanical prophylaxis: Place SCDs    BP parameters: Infarct: No intervention, SBP <220        Cardiac/Vascular  Primary hypertension  Stroke risk factor    - SBP <220  - Restarting amlodipine and continue carvedilol         Recommendations:     Post-discharge complication risks: Falls, Seizure    Stroke Education given to: patient and family    Follow-up in Stroke Clinic in 4-6 weeks.     Discharge Plan:  Antithrombotics: Aspirin 81 mg, Clopidogrel 75 mg  Statin: Atorvastatin 80 mg  Aggresive risk factor modification:  Hypertension  High Cholesterol  Diet    Follow Up:      Patient Instructions:      Ambulatory referral/consult to Vascular Neurology   Standing Status: Future   Referral Priority: Routine Referral Type: Consultation   Referral Reason: Specialty Services Required   Requested Specialty: Vascular Neurology   Number of Visits Requested: 1       Medications:  Reconciled Home  Medications:      Medication List        CONTINUE taking these medications      amLODIPine 10 MG tablet  Commonly known as: NORVASC  Take 5 mg by mouth once daily.     aspirin 81 MG EC tablet  Commonly known as: ECOTRIN  Take 81 mg by mouth once daily.     atorvastatin 80 MG tablet  Commonly known as: LIPITOR  Take 80 mg by mouth once daily.     buPROPion 150 MG TBSR 12 hr tablet  Commonly known as: WELLBUTRIN SR  Take 150 mg by mouth 2 (two) times daily.     * carvediloL 12.5 MG tablet  Commonly known as: COREG  Take 12.5 mg by mouth every morning.     * carvediloL 25 MG tablet  Commonly known as: COREG  Take 25 mg by mouth every evening.     clopidogreL 75 mg tablet  Commonly known as: PLAVIX  Take 75 mg by mouth once daily.     cyanocobalamin 1000 MCG tablet  Commonly known as: VITAMIN B-12  Take 100 mcg by mouth once daily.     diltiaZEM 120 mg 24 hr tablet  Commonly known as: CARDIZEM LA  Take 1 tablet (120 mg total) by mouth once daily.     FLUoxetine 20 MG capsule  Take 60 mg by mouth once daily.     gabapentin 100 MG capsule  Commonly known as: NEURONTIN  Take 200 mg by mouth 2 (two) times daily.     hydroCHLOROthiazide 12.5 MG Tab  Commonly known as: HYDRODIURIL  Take 12.5 mg by mouth once daily.     lisinopriL 20 MG tablet  Commonly known as: PRINIVIL,ZESTRIL  Take 20 mg by mouth once daily.     loperamide 2 mg Tab  Commonly known as: IMODIUM A-D  Take 2 mg by mouth 3 (three) times daily as needed.     melatonin 1 mg Tbdl  Take 6 mg by mouth nightly as needed.     metFORMIN 1000 MG tablet  Commonly known as: GLUCOPHAGE  Take 1,000 mg by mouth 2 (two) times daily with meals.     multivitamin per tablet  Commonly known as: THERAGRAN  Take 1 tablet by mouth once daily.     MYRBETRIQ 25 mg Tb24 ER tablet  Generic drug: mirabegron  Take 25 mg by mouth once daily.     QUEtiapine 25 MG Tab  Commonly known as: SEROQUEL  Take 50 mg by mouth every evening.     SENNA WITH DOCUSATE SODIUM 8.6-50 mg per tablet  Generic  drug: senna-docusate 8.6-50 mg  Take 2 tablets by mouth daily as needed for Constipation.     traZODone 50 MG tablet  Commonly known as: DESYREL  Take 25 mg by mouth nightly as needed for Insomnia.     vitamin D 1000 units Tab  Commonly known as: VITAMIN D3  Take 2,000 Units by mouth once daily.           * This list has 2 medication(s) that are the same as other medications prescribed for you. Read the directions carefully, and ask your doctor or other care provider to review them with you.                ASK your doctor about these medications      acetaminophen 650 MG Tbsr  Commonly known as: TYLENOL  Take 650 mg by mouth every 6 (six) hours as needed.              Eran Ortega MD  Comprehensive Stroke Center  Department of Vascular Neurology   [facility] Neurosurgery [facility])

## 2024-09-15 NOTE — PROGRESS NOTES
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 male with PMHx of aflutter, harriett infarct, depssion, PTSD, GERD, HLD, HTN, DM, and first degree AV block who presented to ED from [facility] for worsening RSW. [facility] harriett infarct. He was discharged to rehab on DAPT and atorvastatin. Per patient, he woke up with worsening RSW this morning. Stroke code activated on ED arrival. Patient last known normal yesterday at 10pm. CTA confirmed L harriett infarct, no LVO. Patient had some improvement in strength on the R side while in ED. Attempting to obtain repeat MRI however will need to obtain info on patient's penile implant prior to scan. Will admit to vascular neurology for observation.    9/15: Return to [facility].    Antithrombotics for secondary stroke prevention: Antiplatelets: Aspirin: 81 mg daily  Clopidogrel: 75 mg daily    Statins for secondary stroke prevention and hyperlipidemia, if present:   Statins: Atorvastatin- 80 mg daily    Aggressive risk factor modification: HTN, DM, HLD     Rehab efforts: The patient has been evaluated by a stroke team provider and the therapy needs have been fully considered based off the presenting complaints and exam findings. The following therapy evaluations are needed: PT evaluate and treat, OT evaluate and treat, SLP evaluate and treat, PM&R evaluate for appropriate placement    Diagnostics ordered/pending: HgbA1C to assess blood glucose levels, MRI head without contrast to assess brain parenchyma    VTE prophylaxis: Heparin 5000 units SQ every 8 hours  Mechanical prophylaxis: Place SCDs    BP parameters: Infarct: No intervention, SBP <220        Type 2 diabetes mellitus without complication, without long-term current use of insulin  Stroke risk factor. A1C 5.    - Hold home metformin  - Glucose 140-180  -  SSI    Depression  Continue home meds    PTSD (post-traumatic stress disorder)  Continue home meds    Mixed hyperlipidemia  Stroke risk factor    - Continue atorvastatin 80    Primary hypertension  Stroke risk factor    - SBP <220  - Restarting amlodipine and continue carvedilol     Atrial flutter  Stroke risk factor. Confirmed on EKG in 2017.    - Not currently on anticoagulation         Eran Patel is a 73 year old male with history of aflutter, harriett infarct, depression , ptsd, gerd, hld, htn, first AV block presented from [facility] for worsening RSW. [facility] pontine [facility]. Discharged to rehab with dapt and atorvastatin. CTA without LVO. CT H without interval changes to prior rickey pontine infarct. His right sided weakness has gradually improved. He is able to lift the right leg antigravity. Return to [facility] with current regimen including dapt, atorvastatin 80 mg, coreg, amlodipine, lisinopril, and diltiazem. Follow up with vascular neurology in 4-6 weeks.     STROKE DOCUMENTATION   Acute Stroke Times   Last Known Normal Date: 09/12/24  Last Known Normal Time: 2200  Unknown Symptom Onset Date: Unknown Date  Unknown Symptom Onset Time: Unknown Time  Stroke Team Called Date: 09/13/24  Stroke Team Called Time: 1237  Stroke Team Arrival Date: 09/13/24  Stroke Team Arrival Time: 1244  CT Interpretation Time: 1247  Thrombolytic Therapy Recommended: No  CTA Interpretation Time: 1249  Thrombectomy Recommended: No    NIH Scale:  1a. Level of Consciousness: 0-->Alert, keenly responsive  1b. LOC Questions: 0-->Answers both questions correctly  1c. LOC Commands: 0-->Performs both tasks correctly  2. Best Gaze: 0-->Normal  3. Visual: 0-->No visual loss  4. Facial Palsy: 1-->Minor paralysis (flattened nasolabial fold, asymmetry on smiling)  5a. Motor Arm, Left: 0-->No drift, limb holds 90 (or 45) degrees for full 10 secs  5b. Motor Arm, Right: 1-->Drift, limb holds 90 (or 45) degrees, but  drifts down before full 10 secs, does not hit bed or other support  6a. Motor Leg, Left: 0-->No drift, leg holds 30 degree position for full 5 secs  6b. Motor Leg, Right: 1-->Drift, leg falls by the end of the 5-sec period but does not hit bed  7. Limb Ataxia: 0-->Absent  8. Sensory: 0-->Normal, no sensory loss  9. Best Language: 0-->No aphasia, normal  10. Dysarthria: 1-->Mild-to-moderate dysarthria, patient slurs at least some words and, at worst, can be understood with some difficulty  11. Extinction and Inattention (formerly Neglect): 0-->No abnormality  Total (NIH Stroke Scale): 4       Modified [place] Score: 3  Greg Coma Scale:    ABCD2 Score:    [id number] Score:   HAS -BLED Score:   ICH Score:   Hunt & Guerrero Classification:      Hemorrhagic change of an Ischemic Stroke: Does this patient have an ischemic stroke with hemorrhagic changes? No     Interval History: CT H stable without interval changes. Exam improving; right leg is antigravity. Return to [facility].      Past Medical History:   Diagnosis Date    Diabetes mellitus     High cholesterol     Hypertension     Neuropathy     PTSD (post-traumatic stress disorder)      History reviewed. No pertinent surgical history.  Social History     Tobacco Use    Smoking status: Former    Smokeless tobacco: Never   Substance Use Topics    Alcohol use: Yes    Drug use: No     Review of patient's allergies indicates:  No Known Allergies    Medications: I have reviewed the current medication administration record.    Medications Prior to Admission   Medication Sig Dispense Refill Last Dose    acetaminophen (TYLENOL) 650 MG TbSR Take 650 mg by mouth every 6 (six) hours as needed.       amlodipine (NORVASC) 10 MG tablet Take 5 mg by mouth once daily.       aspirin (ECOTRIN) 81 MG EC tablet Take 81 mg by mouth once daily.       atorvastatin (LIPITOR) 80 MG tablet Take 80 mg by mouth once daily.       buPROPion (WELLBUTRIN SR) 150 MG TBSR 12 hr tablet Take 150 mg by mouth 2  (two) times daily.       carvediloL (COREG) 12.5 MG tablet Take 12.5 mg by mouth every morning.       carvediloL (COREG) 25 MG tablet Take 25 mg by mouth every evening.       clopidogreL (PLAVIX) 75 mg tablet Take 75 mg by mouth once daily.       cyanocobalamin (VITAMIN B-12) 1000 MCG tablet Take 100 mcg by mouth once daily.       diltiaZEM (CARDIZEM LA) 120 mg 24 hr tablet Take 1 tablet (120 mg total) by mouth once daily. 30 tablet 0     fluoxetine (PROZAC) 20 MG capsule Take 60 mg by mouth once daily.       gabapentin (NEURONTIN) 100 MG capsule Take 200 mg by mouth 2 (two) times daily.       hydroCHLOROthiazide (HYDRODIURIL) 12.5 MG Tab Take 12.5 mg by mouth once daily.       lisinopriL (PRINIVIL,ZESTRIL) 20 MG tablet Take 20 mg by mouth once daily.       loperamide (IMODIUM A-D) 2 mg Tab Take 2 mg by mouth 3 (three) times daily as needed.       melatonin 1 mg TbDL Take 6 mg by mouth nightly as needed.       metformin (GLUCOPHAGE) 1000 MG tablet Take 1,000 mg by mouth 2 (two) times daily with meals.       mirabegron (MYRBETRIQ) 25 mg Tb24 ER tablet Take 25 mg by mouth once daily.       multivitamin (THERAGRAN) per tablet Take 1 tablet by mouth once daily.       QUEtiapine (SEROQUEL) 25 MG Tab Take 50 mg by mouth every evening.       senna-docusate 8.6-50 mg (SENNA WITH DOCUSATE SODIUM) 8.6-50 mg per tablet Take 2 tablets by mouth daily as needed for Constipation.       traZODone (DESYREL) 50 MG tablet Take 25 mg by mouth nightly as needed for Insomnia.       vitamin D (VITAMIN D3) 1000 units Tab Take 2,000 Units by mouth once daily.          Review of Systems   Constitutional:  Negative for fever.   HENT:  Negative for congestion and sore throat.    Genitourinary:  Negative for dysuria.   Neurological:  Positive for facial asymmetry, speech difficulty and weakness. Negative for numbness.     Objective:     Vital Signs (Most Recent):  Temp: 98.2 °F (36.8 °C) (09/15/24 1139)  Pulse: 75 (09/15/24 1139)  Resp: 18  (09/15/24 1139)  BP: (!) 187/86 (09/15/24 1139)  SpO2: 95 % (09/15/24 1139)    Vital Signs Range (Last 24H):  Temp:  [97.1 °F (36.2 °C)-98.2 °F (36.8 °C)]   Pulse:  [72-83]   Resp:  [17-20]   BP: (166-195)/(80-91)   SpO2:  [93 %-98 %]        Physical Exam  Vitals reviewed.   Constitutional:       General: He is not in acute distress.  HENT:      Head: Normocephalic and atraumatic.   Cardiovascular:      Rate and Rhythm: Normal rate and regular rhythm.   Pulmonary:      Effort: Pulmonary effort is normal. No respiratory distress.   Skin:     General: Skin is warm and dry.   Neurological:      Mental Status: He is alert.              Neurological Exam:   LOC: alert  Attention Span: Good   Language: No aphasia  Articulation: Dysarthria  Orientation: Person, Place, Time   Visual Fields: Full  EOM (CN III, IV, VI): Full/intact  Facial Movement (CN VII): Lower facial weakness on the Right  Motor: Arm left  Normal 5/5  Leg left  Normal 5/5  Arm right  Paresis: 4/5  Leg right Paresis: 2/5  Sensation: Intact to light touch, temperature and vibration  Tone: Normal tone throughout      Laboratory:  CMP:   Recent Labs   Lab 09/15/24  0336   CALCIUM 9.4   ALBUMIN 3.2*   PROT 6.5      K 3.3*   CO2 24      BUN 20   CREATININE 0.9   ALKPHOS 99   ALT 17   AST 13   BILITOT 0.7     CBC:   Recent Labs   Lab 09/15/24  0336   WBC 8.40   RBC 3.93*   HGB 12.3*   HCT 37.9*      MCV 96   MCH 31.3*   MCHC 32.5     Lipid Panel:   Recent Labs   Lab 09/13/24  1249   CHOL 122   LDLCALC 71.0   HDL 29*   TRIG 110     Coagulation:   Recent Labs   Lab 09/14/24  0506   INR 1.0   APTT 29.3     Hgb A1C:   Recent Labs   Lab 09/13/24  2352   HGBA1C 5.2     TSH:   Recent Labs   Lab 09/13/24  1249   TSH 0.787       Diagnostic Results:      Brain imaging:  MRI Brain pending    Vessel Imaging:  CTA multiphase 9/13/24  FINDINGS:  Prominence of ventricles and sulci in keeping with cerebral volume loss.  This appears fairly generalized,  without overt lobar predominance. Configuration is not suggestive of hydrocephalus.     Focal hypoattenuation in the left aspect of the harriett concerning for an acute lacunar type infarct.        Additional patchy and confluent regions of hypoattenuation in the supratentorial white matter, nonspecific but most likely reflecting chronic small vessel ischemic changes. No recent or remote major vascular distribution infarct. No acute hemorrhage.  No mass effect or midline shift.     No extra-axial blood or fluid collections.     The cranium appears intact. Mastoid air cells and paranasal sinuses are essentially clear.     Moderate degenerative change throughout the cervical spine.        CTA:     The aortic arch demonstratesatherosclerotic calcification, but no significant stenosis at the major branch vessel origins.     The right common carotid artery is normal in caliber.  No significant plaque or stenosis at the carotid bifurcation.The right internal carotid artery is normal in caliber.     The left common carotid artery is normal in caliber. Calcification without significant stenosis at the carotid bifurcation.The left internal carotid artery is normal in caliber.     The cervical right vertebral artery is normal in caliber.  Mild plaque in narrowing in the intracranial segment.     Cervical segment of the left vertebral artery is normal in caliber.  Mild plaque in narrowing in the intracranial segment     Scattered atherosclerotic irregularity and narrowing throughout the basilar artery     Moderate focal stenosis at the origin of the left PCA.     Moderate focal stenosis in the distal P1 segment of the right PCA.     Mild focal narrowing at the origin of the right MCA.  Additional moderate stenosis the origin of the superior and inferior divisions.  Tapered narrowing involving the distal M1 segment of the left MCA with mild narrowing distally.     Proximal ACAs appear within normal limits but there is mild  irregularity narrowing distally.     Findings were relayed to the ordering provider (Concha ) via the epic secure chat system at approximately 13:00.    Cardiac Evaluation:   N/A      Eran Ortega MD  [facility]  Department of Vascular Neurology   [facility])

## 2024-09-15 NOTE — PLAN OF CARE
Troy Mclean - Neurosurgery (Hospital)  Discharge Final Note    Primary Care Provider: Home, [facility]    Expected Discharge Date: 9/15/2024    Final Discharge Note (most recent)       Final Note - 09/15/24 1201          Final Note    Assessment Type Final Discharge Note     Anticipated Discharge Disposition Rehab Facility     What phone number can be called within the next 1-3 days to see how you are doing after discharge? [phone]     Hospital Resources/Appts/Education Provided Provided patient/caregiver with written discharge plan information;Appointments scheduled and added to AVS        Post-Acute Status    Post-Acute Authorization Placement     Post-Acute Placement Status Set-up Complete/Auth obtained     Patient choice form signed by patient/caregiver List with quality metrics by geographic area provided     Discharge Delays None known at this time                   Patient to discharge and return to [facility] today via [facility].    YURI Machado  [facility]   Ext: [phone]

## 2024-09-15 NOTE — PLAN OF CARE
Problem: Stroke, Ischemic (Includes Transient Ischemic Attack)  Goal: Optimal Cerebral Tissue Perfusion  Outcome: Progressing  Goal: Improved Communication Skills  Outcome: Progressing  Goal: Optimal Functional Ability  Outcome: Progressing  Goal: Safe and Effective Swallow  Outcome: Progressing     Problem: Adult Inpatient Plan of Care  Goal: Plan of Care Review  Outcome: Progressing  Goal: Readiness for Transition of Care  Outcome: Progressing  POC and modified Stroke factors reviewed with patient.  SCDs on; all safety measures maintained. Bed alarm in progress; bed at lowest position; all personal items within reach. Patient AOX4 with periods of confusion at night times. Aspiration precaution maintained. POC ongoing.

## 2024-09-15 NOTE — CONSULTS
Food & Nutrition  Education    Diet Education: cardiac diet education   Time Spent: 4 minutes   Learners: Pt       Nutrition Education provided with handouts:   Handouts discussed limiting foods high in saturated fats such as fatty meat, poultry, skin, vale, sausage, whole milk, cream and butter. RD additionally discussed incorporating more fruits, vegetables, whole grains, and dried beans in pt's diet. Foods recommended/not recommended discussed, sample menus provided.       Comments: Pt consuming % of meals provided.     All questions and concerns answered. Dietitian's contact information provided.     Follow-Up: Yes     Please Re-consult as needed        Thanks!

## 2024-09-15 NOTE — CARE UPDATE
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

## 2024-09-15 NOTE — SUBJECTIVE & OBJECTIVE
Interval History: CT H stable without interval changes. Exam improving; right leg is antigravity. Return to [facility].      Past Medical History:   Diagnosis Date    Diabetes mellitus     High cholesterol     Hypertension     Neuropathy     PTSD (post-traumatic stress disorder)      History reviewed. No pertinent surgical history.  Social History     Tobacco Use    Smoking status: Former    Smokeless tobacco: Never   Substance Use Topics    Alcohol use: Yes    Drug use: No     Review of patient's allergies indicates:  No Known Allergies    Medications: I have reviewed the current medication administration record.    Medications Prior to Admission   Medication Sig Dispense Refill Last Dose    acetaminophen (TYLENOL) 650 MG TbSR Take 650 mg by mouth every 6 (six) hours as needed.       amlodipine (NORVASC) 10 MG tablet Take 5 mg by mouth once daily.       aspirin (ECOTRIN) 81 MG EC tablet Take 81 mg by mouth once daily.       atorvastatin (LIPITOR) 80 MG tablet Take 80 mg by mouth once daily.       buPROPion (WELLBUTRIN SR) 150 MG TBSR 12 hr tablet Take 150 mg by mouth 2 (two) times daily.       carvediloL (COREG) 12.5 MG tablet Take 12.5 mg by mouth every morning.       carvediloL (COREG) 25 MG tablet Take 25 mg by mouth every evening.       clopidogreL (PLAVIX) 75 mg tablet Take 75 mg by mouth once daily.       cyanocobalamin (VITAMIN B-12) 1000 MCG tablet Take 100 mcg by mouth once daily.       diltiaZEM (CARDIZEM LA) 120 mg 24 hr tablet Take 1 tablet (120 mg total) by mouth once daily. 30 tablet 0     fluoxetine (PROZAC) 20 MG capsule Take 60 mg by mouth once daily.       gabapentin (NEURONTIN) 100 MG capsule Take 200 mg by mouth 2 (two) times daily.       hydroCHLOROthiazide (HYDRODIURIL) 12.5 MG Tab Take 12.5 mg by mouth once daily.       lisinopriL (PRINIVIL,ZESTRIL) 20 MG tablet Take 20 mg by mouth once daily.       loperamide (IMODIUM A-D) 2 mg Tab Take 2 mg by mouth 3 (three) times daily as needed.       melatonin  1 mg TbDL Take 6 mg by mouth nightly as needed.       metformin (GLUCOPHAGE) 1000 MG tablet Take 1,000 mg by mouth 2 (two) times daily with meals.       mirabegron (MYRBETRIQ) 25 mg Tb24 ER tablet Take 25 mg by mouth once daily.       multivitamin (THERAGRAN) per tablet Take 1 tablet by mouth once daily.       QUEtiapine (SEROQUEL) 25 MG Tab Take 50 mg by mouth every evening.       senna-docusate 8.6-50 mg (SENNA WITH DOCUSATE SODIUM) 8.6-50 mg per tablet Take 2 tablets by mouth daily as needed for Constipation.       traZODone (DESYREL) 50 MG tablet Take 25 mg by mouth nightly as needed for Insomnia.       vitamin D (VITAMIN D3) 1000 units Tab Take 2,000 Units by mouth once daily.          Review of Systems   Constitutional:  Negative for fever.   HENT:  Negative for congestion and sore throat.    Genitourinary:  Negative for dysuria.   Neurological:  Positive for facial asymmetry, speech difficulty and weakness. Negative for numbness.     Objective:     Vital Signs (Most Recent):  Temp: 98.2 °F (36.8 °C) (09/15/24 1139)  Pulse: 75 (09/15/24 1139)  Resp: 18 (09/15/24 1139)  BP: (!) 187/86 (09/15/24 1139)  SpO2: 95 % (09/15/24 1139)    Vital Signs Range (Last 24H):  Temp:  [97.1 °F (36.2 °C)-98.2 °F (36.8 °C)]   Pulse:  [72-83]   Resp:  [17-20]   BP: (166-195)/(80-91)   SpO2:  [93 %-98 %]        Physical Exam  Vitals reviewed.   Constitutional:       General: He is not in acute distress.  HENT:      Head: Normocephalic and atraumatic.   Cardiovascular:      Rate and Rhythm: Normal rate and regular rhythm.   Pulmonary:      Effort: Pulmonary effort is normal. No respiratory distress.   Skin:     General: Skin is warm and dry.   Neurological:      Mental Status: He is alert.              Neurological Exam:   LOC: alert  Attention Span: Good   Language: No aphasia  Articulation: Dysarthria  Orientation: Person, Place, Time   Visual Fields: Full  EOM (CN III, IV, VI): Full/intact  Facial Movement (CN VII): Lower facial  weakness on the Right  Motor: Arm left  Normal 5/5  Leg left  Normal 5/5  Arm right  Paresis: 4/5  Leg right Paresis: 2/5  Sensation: Intact to light touch, temperature and vibration  Tone: Normal tone throughout      Laboratory:  CMP:   Recent Labs   Lab 09/15/24  0336   CALCIUM 9.4   ALBUMIN 3.2*   PROT 6.5      K 3.3*   CO2 24      BUN 20   CREATININE 0.9   ALKPHOS 99   ALT 17   AST 13   BILITOT 0.7     CBC:   Recent Labs   Lab 09/15/24  0336   WBC 8.40   RBC 3.93*   HGB 12.3*   HCT 37.9*      MCV 96   MCH 31.3*   MCHC 32.5     Lipid Panel:   Recent Labs   Lab 09/13/24  1249   CHOL 122   LDLCALC 71.0   HDL 29*   TRIG 110     Coagulation:   Recent Labs   Lab 09/14/24  0506   INR 1.0   APTT 29.3     Hgb A1C:   Recent Labs   Lab 09/13/24  2352   HGBA1C 5.2     TSH:   Recent Labs   Lab 09/13/24  1249   TSH 0.787       Diagnostic Results:      Brain imaging:  MRI Brain pending    Vessel Imaging:  CTA multiphase 9/13/24  FINDINGS:  Prominence of ventricles and sulci in keeping with cerebral volume loss.  This appears fairly generalized, without overt lobar predominance. Configuration is not suggestive of hydrocephalus.     Focal hypoattenuation in the left aspect of the harriett concerning for an acute lacunar type infarct.        Additional patchy and confluent regions of hypoattenuation in the supratentorial white matter, nonspecific but most likely reflecting chronic small vessel ischemic changes. No recent or remote major vascular distribution infarct. No acute hemorrhage.  No mass effect or midline shift.     No extra-axial blood or fluid collections.     The cranium appears intact. Mastoid air cells and paranasal sinuses are essentially clear.     Moderate degenerative change throughout the cervical spine.        CTA:     The aortic arch demonstratesatherosclerotic calcification, but no significant stenosis at the major branch vessel origins.     The right common carotid artery is normal in  caliber.  No significant plaque or stenosis at the carotid bifurcation.The right internal carotid artery is normal in caliber.     The left common carotid artery is normal in caliber. Calcification without significant stenosis at the carotid bifurcation.The left internal carotid artery is normal in caliber.     The cervical right vertebral artery is normal in caliber.  Mild plaque in narrowing in the intracranial segment.     Cervical segment of the left vertebral artery is normal in caliber.  Mild plaque in narrowing in the intracranial segment     Scattered atherosclerotic irregularity and narrowing throughout the basilar artery     Moderate focal stenosis at the origin of the left PCA.     Moderate focal stenosis in the distal P1 segment of the right PCA.     Mild focal narrowing at the origin of the right MCA.  Additional moderate stenosis the origin of the superior and inferior divisions.  Tapered narrowing involving the distal M1 segment of the left MCA with mild narrowing distally.     Proximal ACAs appear within normal limits but there is mild irregularity narrowing distally.     Findings were relayed to the ordering provider (Concha ) via the epic secure chat system at approximately 13:00.    Cardiac Evaluation:   N/A

## 2024-09-15 NOTE — PLAN OF CARE
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       Allergies:Review of patient's allergies indicates:  No Known Allergies    Diet: diabetic diet: 2000 calorie    Activities: Activity as tolerated    Goals of Care Treatment Preferences:  Code Status: Full Code      Nursing: Daily     Labs: CBC and CMP Daily for 21 days     CONSULTS:    Physical Therapy to evaluate and treat. , Occupational Therapy to evaluate and treat., Speech Therapy to evaluate and treat for Language, Swallowing, and Cognition., and  to evaluate for community resources/long-range planning.    MISCELLANEOUS CARE:  Routine Skin for Bedridden Patients: Apply moisture barrier cream to all skin folds and wet areas in perineal area daily and after baths and all bowel movements. and Diabetes Care:   SN to perform and educate Diabetic management with blood glucose monitoring:, Fingerstick blood sugar a.m. and p.m., and Report CBG < 60 or > 350 to physician.    WOUND CARE ORDERS  Yes: Inspect heels and sacrum regularly     Medications: Review discharge medications with patient and family and provide education.         Medication List        CONTINUE taking these medications       amLODIPine 10 MG tablet  Commonly known as: NORVASC  Take 5 mg by mouth once daily.     aspirin 81 MG EC tablet  Commonly known as: ECOTRIN  Take 81 mg by mouth once daily.     atorvastatin 80 MG tablet  Commonly known as: LIPITOR  Take 80 mg by mouth once daily.     buPROPion 150 MG TBSR 12 hr tablet  Commonly known as: WELLBUTRIN SR  Take 150 mg by mouth 2 (two) times daily.     * carvediloL 12.5 MG tablet  Commonly known as: COREG  Take 12.5 mg by mouth every morning.     * carvediloL 25 MG tablet  Commonly known as: COREG  Take 25 mg by mouth every evening.     clopidogreL 75 mg tablet  Commonly known as: PLAVIX  Take 75 mg by mouth once daily.     cyanocobalamin 1000 MCG tablet  Commonly known as: VITAMIN B-12  Take 100 mcg by mouth once daily.     diltiaZEM 120 mg 24 hr tablet  Commonly known as: CARDIZEM LA  Take 1 tablet (120 mg total) by mouth once daily.     FLUoxetine 20 MG capsule  Take 60 mg by mouth once daily.     gabapentin 100 MG capsule  Commonly known as: NEURONTIN  Take 200 mg by mouth 2 (two) times daily.     hydroCHLOROthiazide 12.5 MG Tab  Commonly known as: HYDRODIURIL  Take 12.5 mg by mouth once daily.     lisinopriL 20 MG tablet  Commonly known as: PRINIVIL,ZESTRIL  Take 20 mg by mouth once daily.     loperamide 2 mg Tab  Commonly known as: IMODIUM A-D  Take 2 mg by mouth 3 (three) times daily as needed.     melatonin 1 mg Tbdl  Take 6 mg by mouth nightly as needed.     metFORMIN 1000 MG tablet  Commonly known as: GLUCOPHAGE  Take 1,000 mg by mouth 2 (two) times daily with meals.     multivitamin per tablet  Commonly known as: THERAGRAN  Take 1 tablet by mouth once daily.     MYRBETRIQ 25 mg Tb24 ER tablet  Generic drug: mirabegron  Take 25 mg by mouth once daily.     QUEtiapine 25 MG Tab  Commonly known as: SEROQUEL  Take 50 mg by mouth every evening.     SENNA WITH DOCUSATE SODIUM 8.6-50 mg per tablet  Generic drug: senna-docusate 8.6-50 mg  Take 2 tablets by mouth daily as needed for  Constipation.     traZODone 50 MG tablet  Commonly known as: DESYREL  Take 25 mg by mouth nightly as needed for Insomnia.     vitamin D 1000 units Tab  Commonly known as: VITAMIN D3  Take 2,000 Units by mouth once daily.           * This list has 2 medication(s) that are the same as other medications prescribed for you. Read the directions carefully, and ask your doctor or other care provider to review them with you.                ASK your doctor about these medications      acetaminophen 650 MG Tbsr  Commonly known as: TYLENOL  Take 650 mg by mouth every 6 (six) hours as needed.                Immunizations Administered as of 9/15/2024       No immunizations on file.            This patient has had both covid vaccinations    Some patients may experience side effects after vaccination.  These may include fever, headache, muscle or joint aches.  Most symptoms resolve with 24-48 hours and do not require urgent medical evaluation unless they persist for more than 72 hours or symptoms are concerning for an unrelated medical condition.          _________________________________  Eran Ortega MD  09/15/2024

## 2024-09-15 NOTE — PT/OT/SLP EVAL
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 male with an SLP diagnosis of  functional oropharyngeal swallow .  He is appropriate to continue a regular diet with thin liquids.    History:     Past Medical History:   Diagnosis Date    Diabetes mellitus     High cholesterol     Hypertension     Neuropathy     PTSD (post-traumatic stress disorder)        History reviewed. No pertinent surgical history.    Chest X-Rays: none this admission       Subjective     Spoke with RN prior to entering pt's room . Pt seen bedside for session. Alert and agreeable to ST.       Pain/Comfort:  Pain Rating 1: 0/10  Pain Rating Post-Intervention 1: 0/10    Respiratory Status: Room air    Objective:     Oral Musculature Evaluation  Oral Musculature: right weakness  Dentition: present and adequate  Secretion Management: adequate  Mucosal Quality: adequate  Volitional Cough: elicited  Volitional Swallow: elicited  Voice Prior to PO Intake: clear    Bedside Swallow Eval:   Consistencies Assessed:  Thin liquids via single and consecutive straw sips  Solids crackers      Oral Phase:   WFL    Pharyngeal Phase:   no overt clinical signs/symptoms of aspiration  no overt clinical signs/symptoms of pharyngeal dysphagia    Compensatory Strategies  None    Treatment: Provided education to patient re: role of ST,  POC, swallow precautions, recommendations for regular diet with  thin  liquids, and plan to f/u to ensure diet tolerance and to complete  cognitive-communication evaluation. He  verbalized understanding. White board updated. RN notified of results and recs. At end of session, pt remained bedside with call light and all needs in reach.       Goals:   Multidisciplinary Problems       SLP Goals          Problem: SLP    Goal Priority Disciplines Outcome   SLP Goal     SLP    Description: Goals expected to be met by 9/22:  1. Pt will tolerate least restrictive diet without overt s/sx airway threat.    2. Pt will participate in cognitive-linguistic evaluation to further develop POC.                          Plan:     Patient to be seen:  3 x/week   Plan of Care expires:  10/14/24  Plan of Care reviewed with:  patient   SLP Follow-Up:  Yes       Discharge recommendations:  High Intensity Therapy   Barriers to Discharge:  Level of Skilled Assistance Needed      Time Tracking:     SLP Treatment Date:   09/15/24  Speech Start Time:  0935  Speech Stop Time:  0943     Speech Total Time (min):  8 min    Billable Minutes: Eval Swallow and Oral Function 8    09/15/2024

## 2024-09-15 NOTE — ASSESSMENT & PLAN NOTE
Eran Patel is a 73 y.o. male with PMHx of aflutter, harriett infarct, depssion, PTSD, GERD, HLD, HTN, DM, and first degree AV block who presented to ED from [facility] for worsening RSW. [facility] with harriett infarct. He was discharged to rehab on DAPT and atorvastatin. Per patient, he woke up with worsening RSW this morning. Stroke code activated on ED arrival. Patient last known normal yesterday at 10pm. CTA confirmed L harriett infarct, no LVO. Patient had some improvement in strength on the R side while in ED. Attempting to obtain repeat MRI however will need to obtain info on patient's penile implant prior to scan. Will admit to vascular neurology for observation.    9/15: Return to [facility].    Antithrombotics for secondary stroke prevention: Antiplatelets: Aspirin: 81 mg daily  Clopidogrel: 75 mg daily    Statins for secondary stroke prevention and hyperlipidemia, if present:   Statins: Atorvastatin- 80 mg daily    Aggressive risk factor modification: HTN, DM, HLD     Rehab efforts: The patient has been evaluated by a stroke team provider and the therapy needs have been fully considered based off the presenting complaints and exam findings. The following therapy evaluations are needed: PT evaluate and treat, OT evaluate and treat, SLP evaluate and treat, PM&R evaluate for appropriate placement    Diagnostics ordered/pending: HgbA1C to assess blood glucose levels, MRI head without contrast to assess brain parenchyma    VTE prophylaxis: Heparin 5000 units SQ every 8 hours  Mechanical prophylaxis: Place SCDs    BP parameters: Infarct: No intervention, SBP <220

## 2024-09-15 NOTE — PLAN OF CARE
Bedside swallow assessment completed. Recommend continue regular texture diet with thin liquids. ST to f/u to ensure tolerance and to complete cognitive-linguistic evaluation.   Sheeba Barrios CCC-SLP  9/15/2024  11:21 AM

## 2024-09-15 NOTE — PROGRESS NOTES
Patient refused heparin short x2. He stated that this medication interfered with his bowel movement.Multiple attempts were made and instructed the consequence it could have on his health; still refused. Md notified.